# Patient Record
Sex: MALE | Race: WHITE | Employment: FULL TIME | URBAN - METROPOLITAN AREA
[De-identification: names, ages, dates, MRNs, and addresses within clinical notes are randomized per-mention and may not be internally consistent; named-entity substitution may affect disease eponyms.]

---

## 2017-04-12 ENCOUNTER — HOSPITAL ENCOUNTER (OUTPATIENT)
Dept: OTHER | Age: 57
Discharge: HOME | End: 2017-04-12
Attending: INTERNAL MEDICINE | Admitting: INTERNAL MEDICINE

## 2017-04-12 LAB
ALBUMIN SERPL-MCNC: 4.8 GM/DL (ref 3.4–5)
ALP BLD-CCNC: 56 IU/L (ref 40–128)
ALT SERPL-CCNC: 19 U/L (ref 10–40)
ANION GAP SERPL CALCULATED.3IONS-SCNC: 8 MMOL/L (ref 4–16)
AST SERPL-CCNC: 22 IU/L (ref 15–37)
BILIRUB SERPL-MCNC: 1.2 MG/DL (ref 0–1)
BUN BLDV-MCNC: 21 MG/DL (ref 6–23)
CALCIUM SERPL-MCNC: 9.5 MG/DL (ref 8.3–10.6)
CHLORIDE BLD-SCNC: 102 MMOL/L (ref 99–110)
CO2: 30 MMOL/L (ref 21–32)
CREAT SERPL-MCNC: 1.1 MG/DL (ref 0.9–1.3)
GFR AFRICAN AMERICAN: >60 ML/MIN/1.73M2
GFR NON-AFRICAN AMERICAN: >60 ML/MIN/1.73M2
GLUCOSE BLD-MCNC: 97 MG/DL (ref 70–140)
POTASSIUM SERPL-SCNC: 4.7 MMOL/L (ref 3.5–5.1)
SODIUM BLD-SCNC: 140 MMOL/L (ref 135–145)
TOTAL PROTEIN: 6.8 GM/DL (ref 6.4–8.2)

## 2017-05-23 ENCOUNTER — HOSPITAL ENCOUNTER (OUTPATIENT)
Dept: OTHER | Age: 57
Discharge: HOME | End: 2017-05-23
Attending: INTERNAL MEDICINE | Admitting: INTERNAL MEDICINE

## 2017-05-23 LAB
ALBUMIN SERPL-MCNC: 4.5 GM/DL (ref 3.4–5)
ALP BLD-CCNC: 64 IU/L (ref 40–129)
ALT SERPL-CCNC: 20 U/L (ref 10–40)
ANION GAP SERPL CALCULATED.3IONS-SCNC: 12 MMOL/L (ref 4–16)
AST SERPL-CCNC: 26 IU/L (ref 15–37)
BILIRUB SERPL-MCNC: 0.7 MG/DL (ref 0–1)
BUN BLDV-MCNC: 20 MG/DL (ref 6–23)
CALCIUM SERPL-MCNC: 9.5 MG/DL (ref 8.3–10.6)
CHLORIDE BLD-SCNC: 103 MMOL/L (ref 99–110)
CO2: 27 MMOL/L (ref 21–32)
CREAT SERPL-MCNC: 1 MG/DL (ref 0.9–1.3)
FOLATE: >20 NG/ML (ref 3.1–17.5)
GFR AFRICAN AMERICAN: >60 ML/MIN/1.73M2
GFR NON-AFRICAN AMERICAN: >60 ML/MIN/1.73M2
GLUCOSE BLD-MCNC: 127 MG/DL (ref 70–140)
POTASSIUM SERPL-SCNC: 5.1 MMOL/L (ref 3.5–5.1)
SODIUM BLD-SCNC: 142 MMOL/L (ref 135–145)
T3 FREE: 3.2 PG/ML (ref 2.3–4.2)
T4 FREE: 1.24 NG/DL (ref 0.9–1.8)
TOTAL PROTEIN: 6.3 GM/DL (ref 6.4–8.2)
TSH HIGH SENSITIVITY: 2.65 UIU/ML (ref 0.27–4.2)
VITAMIN B-12: 541.2 PG/ML (ref 211–911)

## 2017-05-25 LAB — T4 TOTAL: 7.26 UG/DL

## 2018-04-12 ENCOUNTER — HOSPITAL ENCOUNTER (OUTPATIENT)
Dept: ULTRASOUND IMAGING | Age: 58
Discharge: HOME | End: 2018-04-12
Attending: INTERNAL MEDICINE | Admitting: INTERNAL MEDICINE

## 2018-04-12 DIAGNOSIS — R16.1 SPLENOMEGALY: ICD-10-CM

## 2018-04-12 DIAGNOSIS — R16.1 SPLENOMEGALY, NOT ELSEWHERE CLASSIFIED: ICD-10-CM

## 2018-04-12 LAB
BANDED NEUTROPHILS ABSOLUTE COUNT: 0.29 K/CU MM
BANDED NEUTROPHILS RELATIVE PERCENT: 6 % (ref 5–11)
DIFFERENTIAL TYPE: ABNORMAL
EOSINOPHILS ABSOLUTE: 0.2 K/CU MM
EOSINOPHILS RELATIVE PERCENT: 5 % (ref 0–3)
HCT VFR BLD CALC: 45.8 % (ref 42–52)
HEMOGLOBIN: 14.6 GM/DL (ref 13.5–18)
LYMPHOCYTES ABSOLUTE: 1 K/CU MM
LYMPHOCYTES RELATIVE PERCENT: 21 % (ref 24–44)
MCH RBC QN AUTO: 30.4 PG (ref 27–31)
MCHC RBC AUTO-ENTMCNC: 31.9 % (ref 32–36)
MCV RBC AUTO: 95.4 FL (ref 78–100)
MONOCYTES ABSOLUTE: 0.5 K/CU MM
MONOCYTES RELATIVE PERCENT: 10 % (ref 0–4)
PDW BLD-RTO: 11.9 % (ref 11.7–14.9)
PLATELET # BLD: 238 K/CU MM (ref 140–440)
PLT MORPHOLOGY: ABNORMAL
PMV BLD AUTO: 11.3 FL (ref 7.5–11.1)
RBC # BLD: 4.8 M/CU MM (ref 4.6–6.2)
SEGMENTED NEUTROPHILS ABSOLUTE COUNT: 2.9 K/CU MM
SEGMENTED NEUTROPHILS RELATIVE PERCENT: 58 % (ref 36–66)
WBC # BLD: 4.9 K/CU MM (ref 4–10.5)
WBC # BLD: ABNORMAL 10*3/UL

## 2018-10-09 LAB
ALBUMIN SERPL-MCNC: 4.4 G/DL
ALP BLD-CCNC: 60 U/L
ALT SERPL-CCNC: 18 U/L
ANION GAP SERPL CALCULATED.3IONS-SCNC: 2 MMOL/L
AST SERPL-CCNC: 19 U/L
BILIRUB SERPL-MCNC: 0.5 MG/DL (ref 0.1–1.4)
BUN BLDV-MCNC: 18 MG/DL
CALCIUM SERPL-MCNC: 9.8 MG/DL
CHLORIDE BLD-SCNC: 105 MMOL/L
CHOLESTEROL, TOTAL: 142 MG/DL
CHOLESTEROL/HDL RATIO: 2.9
CO2: 30 MMOL/L
CREAT SERPL-MCNC: 1 MG/DL
GFR CALCULATED: 83
GLUCOSE BLD-MCNC: 99 MG/DL
HDLC SERPL-MCNC: 49 MG/DL (ref 35–70)
LDL CHOLESTEROL CALCULATED: 74 MG/DL (ref 0–160)
POTASSIUM SERPL-SCNC: 5.1 MMOL/L
SODIUM BLD-SCNC: 146 MMOL/L
TOTAL PROTEIN: 6.6
TRIGL SERPL-MCNC: 94 MG/DL
VLDLC SERPL CALC-MCNC: 19 MG/DL

## 2019-04-18 ENCOUNTER — HOSPITAL ENCOUNTER (OUTPATIENT)
Age: 59
Setting detail: SPECIMEN
Discharge: HOME | End: 2019-04-18
Payer: COMMERCIAL

## 2019-04-18 LAB
ALBUMIN SERPL-MCNC: 4.4 GM/DL (ref 3.4–5)
ALP BLD-CCNC: 61 IU/L (ref 40–129)
ALT SERPL-CCNC: 15 U/L (ref 10–40)
ANION GAP SERPL CALCULATED.3IONS-SCNC: 8 MMOL/L (ref 4–16)
AST SERPL-CCNC: 28 IU/L (ref 15–37)
BILIRUB SERPL-MCNC: 1.1 MG/DL (ref 0–1)
BUN BLDV-MCNC: 19 MG/DL (ref 6–23)
CALCIUM SERPL-MCNC: 9.1 MG/DL (ref 8.3–10.6)
CHLORIDE BLD-SCNC: 104 MMOL/L (ref 99–110)
CO2: 29 MMOL/L (ref 21–32)
CREAT SERPL-MCNC: 1 MG/DL (ref 0.9–1.3)
GFR AFRICAN AMERICAN: >60 ML/MIN/1.73M2
GFR NON-AFRICAN AMERICAN: >60 ML/MIN/1.73M2
GLUCOSE BLD-MCNC: 112 MG/DL (ref 70–99)
POTASSIUM SERPL-SCNC: 4.8 MMOL/L (ref 3.5–5.1)
SODIUM BLD-SCNC: 141 MMOL/L (ref 135–145)
TOTAL PROTEIN: 6.4 GM/DL (ref 6.4–8.2)

## 2019-04-18 PROCEDURE — 80053 COMPREHEN METABOLIC PANEL: CPT

## 2019-08-09 DIAGNOSIS — N40.0 BENIGN PROSTATIC HYPERPLASIA, UNSPECIFIED WHETHER LOWER URINARY TRACT SYMPTOMS PRESENT: ICD-10-CM

## 2019-08-09 DIAGNOSIS — A60.02 HERPES SIMPLEX OF MALE GENITALIA: ICD-10-CM

## 2019-10-09 DIAGNOSIS — Z00.00 WELL ADULT EXAM: ICD-10-CM

## 2019-10-09 DIAGNOSIS — Z00.00 WELL ADULT EXAM: Primary | ICD-10-CM

## 2019-10-09 LAB
A/G RATIO: 2.8 (ref 1.1–2.2)
ALBUMIN SERPL-MCNC: 5 G/DL (ref 3.4–5)
ALP BLD-CCNC: 66 U/L (ref 40–129)
ALT SERPL-CCNC: 18 U/L (ref 10–40)
ANION GAP SERPL CALCULATED.3IONS-SCNC: 15 MMOL/L (ref 3–16)
AST SERPL-CCNC: 29 U/L (ref 15–37)
BILIRUB SERPL-MCNC: 1.6 MG/DL (ref 0–1)
BUN BLDV-MCNC: 20 MG/DL (ref 7–20)
CALCIUM SERPL-MCNC: 9.7 MG/DL (ref 8.3–10.6)
CHLORIDE BLD-SCNC: 102 MMOL/L (ref 99–110)
CHOLESTEROL, TOTAL: 149 MG/DL (ref 0–199)
CO2: 24 MMOL/L (ref 21–32)
CREAT SERPL-MCNC: 1 MG/DL (ref 0.9–1.3)
GFR AFRICAN AMERICAN: >60
GFR NON-AFRICAN AMERICAN: >60
GLOBULIN: 1.8 G/DL
GLUCOSE BLD-MCNC: 102 MG/DL (ref 70–99)
HDLC SERPL-MCNC: 58 MG/DL (ref 40–60)
LDL CHOLESTEROL CALCULATED: 72 MG/DL
POTASSIUM SERPL-SCNC: 4.9 MMOL/L (ref 3.5–5.1)
SODIUM BLD-SCNC: 141 MMOL/L (ref 136–145)
TOTAL PROTEIN: 6.8 G/DL (ref 6.4–8.2)
TRIGL SERPL-MCNC: 94 MG/DL (ref 0–150)
VLDLC SERPL CALC-MCNC: 19 MG/DL

## 2019-10-14 ENCOUNTER — OFFICE VISIT (OUTPATIENT)
Dept: FAMILY MEDICINE CLINIC | Age: 59
End: 2019-10-14
Payer: COMMERCIAL

## 2019-10-14 VITALS
WEIGHT: 250 LBS | BODY MASS INDEX: 33.13 KG/M2 | DIASTOLIC BLOOD PRESSURE: 76 MMHG | HEIGHT: 73 IN | SYSTOLIC BLOOD PRESSURE: 122 MMHG | HEART RATE: 66 BPM | OXYGEN SATURATION: 98 %

## 2019-10-14 DIAGNOSIS — A60.02 HERPES SIMPLEX OF MALE GENITALIA: ICD-10-CM

## 2019-10-14 DIAGNOSIS — Z23 NEED FOR PROPHYLACTIC VACCINATION AND INOCULATION AGAINST VARICELLA: Primary | ICD-10-CM

## 2019-10-14 PROCEDURE — 90471 IMMUNIZATION ADMIN: CPT | Performed by: FAMILY MEDICINE

## 2019-10-14 PROCEDURE — 99396 PREV VISIT EST AGE 40-64: CPT | Performed by: FAMILY MEDICINE

## 2019-10-14 PROCEDURE — 90686 IIV4 VACC NO PRSV 0.5 ML IM: CPT | Performed by: FAMILY MEDICINE

## 2019-10-14 RX ORDER — VALACYCLOVIR HYDROCHLORIDE 500 MG/1
500 TABLET, FILM COATED ORAL DAILY
Qty: 90 TABLET | Refills: 3 | Status: SHIPPED | OUTPATIENT
Start: 2019-10-14 | End: 2019-10-18 | Stop reason: SDUPTHER

## 2019-10-14 ASSESSMENT — PATIENT HEALTH QUESTIONNAIRE - PHQ9
SUM OF ALL RESPONSES TO PHQ QUESTIONS 1-9: 0
2. FEELING DOWN, DEPRESSED OR HOPELESS: 0
1. LITTLE INTEREST OR PLEASURE IN DOING THINGS: 0
SUM OF ALL RESPONSES TO PHQ9 QUESTIONS 1 & 2: 0
SUM OF ALL RESPONSES TO PHQ QUESTIONS 1-9: 0

## 2019-10-14 ASSESSMENT — ENCOUNTER SYMPTOMS
ABDOMINAL PAIN: 0
COUGH: 0
CHEST TIGHTNESS: 0
RESPIRATORY NEGATIVE: 1
ALLERGIC/IMMUNOLOGIC NEGATIVE: 1
WHEEZING: 0
SHORTNESS OF BREATH: 0

## 2019-10-18 RX ORDER — VALACYCLOVIR HYDROCHLORIDE 500 MG/1
1000 TABLET, FILM COATED ORAL DAILY
Qty: 90 TABLET | Refills: 3 | Status: SHIPPED | OUTPATIENT
Start: 2019-10-18 | End: 2020-10-15 | Stop reason: SDUPTHER

## 2019-11-06 ENCOUNTER — TELEPHONE (OUTPATIENT)
Dept: FAMILY MEDICINE CLINIC | Age: 59
End: 2019-11-06

## 2020-03-23 ENCOUNTER — TELEPHONE (OUTPATIENT)
Dept: INTERNAL MEDICINE CLINIC | Age: 60
End: 2020-03-23

## 2020-08-31 PROBLEM — R16.1 SPLENOMEGALY, NOT ELSEWHERE CLASSIFIED: Status: ACTIVE | Noted: 2020-08-31

## 2020-08-31 PROBLEM — L43.8 OTHER LICHEN PLANUS: Status: ACTIVE | Noted: 2020-08-31

## 2020-09-09 ENCOUNTER — TELEPHONE (OUTPATIENT)
Dept: ONCOLOGY | Age: 60
End: 2020-09-09

## 2020-09-09 NOTE — TELEPHONE ENCOUNTER
Spoke with patient regarding his U/S on 09.15.2020 patient arr 0830 at [facility]. Gave patient prep and he stated understanding.

## 2020-09-15 ENCOUNTER — HOSPITAL ENCOUNTER (OUTPATIENT)
Dept: ULTRASOUND IMAGING | Age: 60
Discharge: HOME | End: 2020-09-15
Payer: COMMERCIAL

## 2020-09-15 PROCEDURE — 76705 ECHO EXAM OF ABDOMEN: CPT

## 2020-09-17 ENCOUNTER — HOSPITAL ENCOUNTER (OUTPATIENT)
Dept: INFUSION THERAPY | Age: 60
Discharge: HOME | End: 2020-09-17
Payer: COMMERCIAL

## 2020-09-17 DIAGNOSIS — N40.0 BENIGN PROSTATIC HYPERPLASIA WITHOUT LOWER URINARY TRACT SYMPTOMS: ICD-10-CM

## 2020-09-17 LAB
ALBUMIN SERPL-MCNC: 4.7 GM/DL (ref 3.4–5)
ALP BLD-CCNC: 68 IU/L (ref 40–128)
ALT SERPL-CCNC: 17 U/L (ref 10–40)
ANION GAP SERPL CALCULATED.3IONS-SCNC: 10 MMOL/L (ref 4–16)
AST SERPL-CCNC: 24 IU/L (ref 15–37)
BASOPHILS ABSOLUTE: 0 K/CU MM
BASOPHILS RELATIVE PERCENT: 0.5 % (ref 0–1)
BILIRUB SERPL-MCNC: 0.9 MG/DL (ref 0–1)
BUN BLDV-MCNC: 20 MG/DL (ref 6–23)
CALCIUM SERPL-MCNC: 9.6 MG/DL (ref 8.3–10.6)
CHLORIDE BLD-SCNC: 101 MMOL/L (ref 99–110)
CO2: 25 MMOL/L (ref 21–32)
CREAT SERPL-MCNC: 1 MG/DL (ref 0.9–1.3)
DIFFERENTIAL TYPE: ABNORMAL
EOSINOPHILS ABSOLUTE: 0.2 K/CU MM
EOSINOPHILS RELATIVE PERCENT: 2.1 % (ref 0–3)
GFR AFRICAN AMERICAN: >60 ML/MIN/1.73M2
GFR NON-AFRICAN AMERICAN: >60 ML/MIN/1.73M2
GLUCOSE BLD-MCNC: 103 MG/DL (ref 70–99)
HCT VFR BLD CALC: 45.1 % (ref 42–52)
HEMOGLOBIN: 15 GM/DL (ref 13.5–18)
LYMPHOCYTES ABSOLUTE: 1.5 K/CU MM
LYMPHOCYTES RELATIVE PERCENT: 19.9 % (ref 24–44)
MCH RBC QN AUTO: 30.8 PG (ref 27–31)
MCHC RBC AUTO-ENTMCNC: 33.3 % (ref 32–36)
MCV RBC AUTO: 92.6 FL (ref 78–100)
MONOCYTES ABSOLUTE: 0.6 K/CU MM
MONOCYTES RELATIVE PERCENT: 7.7 % (ref 0–4)
PDW BLD-RTO: 13.3 % (ref 11.7–14.9)
PLATELET # BLD: 270 K/CU MM (ref 140–440)
PMV BLD AUTO: 10.7 FL (ref 7.5–11.1)
POTASSIUM SERPL-SCNC: 4.7 MMOL/L (ref 3.5–5.1)
RBC # BLD: 4.87 M/CU MM (ref 4.6–6.2)
SEGMENTED NEUTROPHILS ABSOLUTE COUNT: 5.3 K/CU MM
SEGMENTED NEUTROPHILS RELATIVE PERCENT: 69.8 % (ref 36–66)
SODIUM BLD-SCNC: 136 MMOL/L (ref 135–145)
TOTAL PROTEIN: 6.8 GM/DL (ref 6.4–8.2)
WBC # BLD: 7.5 K/CU MM (ref 4–10.5)

## 2020-09-17 PROCEDURE — 85025 COMPLETE CBC W/AUTO DIFF WBC: CPT

## 2020-09-17 PROCEDURE — 80053 COMPREHEN METABOLIC PANEL: CPT

## 2020-09-17 PROCEDURE — 36415 COLL VENOUS BLD VENIPUNCTURE: CPT

## 2020-09-22 NOTE — PROGRESS NOTES
Patient Name: Lebron Hollins  Patient : 1960  Patient MRN: [id number]     Primary Oncologist: Sabrina De La Fuente MD  Referring Provider: Rico Hashimoto, MD     Date of Service: 2020      Chief Complaint:   Chief Complaint   Patient presents with   [address]     He came in for follow-up visit. Patient Active Problem List:     BPH (benign prostatic hyperplasia)     Herpes simplex of male genitalia     Splenomegaly, not elsewhere classified     Other lichen planus     HPI:   Mr. Loreto Rudd is a pleasant 55-year-old  male patient who was initially referred in 2011 for evaluation of splenomegaly. He had a remote history of EBV. I reviewed his CAT scan at the time with the radiologist who felt that he had an elongated spleen and the volume of the spleen was rather normal.     Serum protein electrophoresis study in 2011 was negative and CBCD was normal, with no signs of hypersplenism. Followup ultrasound of the spleen in 2012 showed no significant change with mild splenomegaly, measuring about 15 cm. Blood test on April 10, 2013 showed white cell count of 8.6, hemoglobin of 14.6, platelet 556, MCV 95. CMP was benign, except for slightly elevated AST at 40. This slightly elevated liver enzyme could be related to the medications versus alcohol. He admitted that he drinks beer occasionally. Blood test in 2013 showed white cell count at 6.5, hemoglobin 15.4, platelet 667. CMP was within normal limits, except for glucose of 107. PSA was 1.29. Abdominal ultrasound in 2013 showed stable mild to moderate splenomegaly at 14.5 cm. There is no sign of splenic sequestration. Colonoscopy in 2013 by Dr. Slime Grewal showed a single, less than 5 mm polyp, found in  rectum, which was removed. The pathology report was consistent with a hyperplastic polyp.   He had green light laser surgery for the BPH on 2013, by Dr. Shital Levi and since then he stopped taking the Flomax. Blood test on April 9, 2014 showed normal CBC and CMP. He lost about 40 pounds over one year period of time. Blood test in October 2014 showed white cell count of 6.9, hemoglobin 15.7, platelet 511. CMP was within normal limits, except for total bilirubin 1.3. Blood test in April 2015 showed normal CBC and CMP. A followup ultrasound of the liver and spleen showed mild hepatosplenomegaly with the liver measuring 19.4 and spleen 13.8 cm. The liver finding was compatible with fatty change. Blood test in October 2015 showed no evidence of splenic sequestration, despite his reported splenomegaly. White cell count was 7.2, hemoglobin 15, platelet 424. CMP was benign, with total bilirubin 1.1. Blood test in April 2016 revealed normal CBC and CMP. There is no sign other splenic sequestration. His ultrasound of the liver and spleen revealed stable splenomegaly, 13.5 cm, with gallstones. He was diagnosed with lichen planus and was treated with a local steroid by the dermatologist, Dr. Vanna Jean Baptiste. He also received steroid injections. Blood test on April 12, 2017, revealed white cell 7.3, hemoglobin 14.8, hematocrit 48.2. MCV was slightly elevated at 102.1, platelet 765. He is agreeable to have his CBC checked in one month with folic acid level, Z75 level, and TSH. CMP was benign, except for the total bilirubin 1.2. I doubt that he has hemolytic anemia. Ultrasound of the abdomen in April 2017 revealed cholelithiasis without gallbladder wall thickening and slight decrease in size of the spleen measuring 13.5 by 4.7 by 5.9. Liver was normal in echogenicity. Ultrasound of abdomen in April 20 18th revealed borderline splenomegaly 13.8 cm, cholelithiasis and fatty liver. Labs in April 2019 were reviewed and stable. On September 24, 2020 he came in for follow-up visit.   Ultrasound in September 2020 showed mild splenomegaly 12.8 cm in length with cholelithiasis and likely hepatic fatty infiltration. We discussed about healthy diet. He denies any symptoms to suggest cholecystitis. CBC was grossly unremarkable. CMP was stable. He lost weight intentionally since last office visit. He denied any acute pain or depression. Reportedly he had cystoscopy in 2019 and was found to have recurrent BPH. He received cream light therapy. He was followed by back I dermatologist.  We discussed about the flu shot. He will follow-up with Dr. Yazmin Ann for colonoscopy. Energy level is OK. He is active. Appetite is okay. He denies any symptoms to suggest COVID-19. No fever or chills. No shortness of breath or cough. No abdominal pain, nausea, vomiting, melena, or hematochezia. No dysuria or hematuria. 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 He has a history of hernia surgery, BPH diagnosed in 2006, and herpetic simplex viral illness diagnosed in 1990. FAMILY HISTORY:  Father had prostate cancer, paternal grandfather had prostate cancer, maternal grandfather had lung cancer, and maternal grandmother had questionable ovarian cancer. SOCIAL HISTORY:  He denied any history of smoking. He drinks beer occasionally. MEDICATIONS:  Reviewed as per chart. ALLERGIES:  Reviewed as per chart. Review of Systems: \"Per interval history; otherwise 10 point ROS is negative. \"     Vital Signs:  /80 (Site: Right Upper Arm, Position: Sitting, Cuff Size: Large Adult)   Pulse 91   Temp 97.9 °F (36.6 °C) (Infrared)   Resp 16   Ht 6' 1\" (1.854 m)   Wt 245 lb 3.2 oz (111.2 kg)   SpO2 96%   BMI 32.35 kg/m²     Physical Exam:  CONSTITUTIONAL: awake, alert, cooperative, no apparent distress   EYES: pupils equal, round and reactive to light, sclera clear and conjunctiva normal  ENT: Normocephalic, without obvious abnormality, atraumatic  NECK: supple, symmetrical, no jugular venous distension and no carotid bruits   HEMATOLOGIC/LYMPHATIC: no cervical, supraclavicular or axillary lymphadenopathy   LUNGS: no increased work of breathing and clear to auscultation   CARDIOVASCULAR: regular rate and rhythm, normal S1 and S2, no murmur noted  ABDOMEN: normal bowel sounds x 4, soft, non-distended, non-tender, no masses palpated, no hepatosplenomegaly   MUSCULOSKELETAL: full range of motion noted, tone is normal  NEUROLOGIC: awake, alert, oriented to name, place and time. Motor skills grossly intact. SKIN: No jaundice. appears intact   EXTREMITIES: no LE edema. Nurses.      Labs:  Hematology:  Lab Results   Component Value Date    WBC 7.5 09/17/2020    RBC 4.87 09/17/2020    HGB 15.0 09/17/2020    HCT 45.1 09/17/2020    MCV 92.6 09/17/2020    MCH 30.8 09/17/2020    MCHC 33.3 09/17/2020    RDW 13.3 09/17/2020     09/17/2020    MPV 10.7 09/17/2020    BANDSPCT 6 04/12/2018    SEGSPCT 69.8 (H) 09/17/2020    EOSRELPCT 2.1 09/17/2020    BASOPCT 0.5 09/17/2020    LYMPHOPCT 19.9 (L) 09/17/2020    MONOPCT 7.7 (H) 09/17/2020    BANDABS 0.29 04/12/2018    SEGSABS 5.3 09/17/2020    EOSABS 0.2 09/17/2020    BASOSABS 0.0 09/17/2020    LYMPHSABS 1.5 09/17/2020    MONOSABS 0.6 09/17/2020    DIFFTYPE AUTOMATED DIFFERENTIAL 09/17/2020    WBCMORP OCCASIONAL 04/12/2018    PLTM SEVERAL LARGE PLATELETS 88/64/8379     Chemistry:  Lab Results   Component Value Date     09/17/2020    K 4.7 09/17/2020     09/17/2020    CO2 25 09/17/2020    BUN 20 09/17/2020    CREATININE 1.0 09/17/2020    GLUCOSE 103 (H) 09/17/2020    CALCIUM 9.6 09/17/2020    PROT 6.8 09/17/2020    LABALBU 4.7 09/17/2020    BILITOT 0.9 09/17/2020    ALKPHOS 68 09/17/2020    AST 24 09/17/2020    ALT 17 09/17/2020    LABGLOM >60 09/17/2020    GFRAA >60 09/17/2020    AGRATIO 2.8 (H) 10/09/2019    GLOB 1.8 10/09/2019     Lab Results   Component Value Date    TSHHS 2.650 05/23/2017    T4FREE 1.24 05/23/2017    FT3 3.2 05/23/2017     Immunology:  Lab Results   Component

## 2020-09-24 ENCOUNTER — HOSPITAL ENCOUNTER (OUTPATIENT)
Dept: INFUSION THERAPY | Age: 60
Discharge: HOME | End: 2020-09-24
Payer: COMMERCIAL

## 2020-09-24 ENCOUNTER — OFFICE VISIT (OUTPATIENT)
Dept: ONCOLOGY | Age: 60
End: 2020-09-24
Payer: COMMERCIAL

## 2020-09-24 VITALS
SYSTOLIC BLOOD PRESSURE: 126 MMHG | HEIGHT: 73 IN | RESPIRATION RATE: 16 BRPM | OXYGEN SATURATION: 96 % | WEIGHT: 245.2 LBS | TEMPERATURE: 97.9 F | DIASTOLIC BLOOD PRESSURE: 80 MMHG | BODY MASS INDEX: 32.5 KG/M2 | HEART RATE: 91 BPM

## 2020-09-24 PROCEDURE — 99211 OFF/OP EST MAY X REQ PHY/QHP: CPT

## 2020-09-24 PROCEDURE — 99213 OFFICE O/P EST LOW 20 MIN: CPT | Performed by: INTERNAL MEDICINE

## 2020-09-24 ASSESSMENT — PATIENT HEALTH QUESTIONNAIRE - PHQ9
1. LITTLE INTEREST OR PLEASURE IN DOING THINGS: 0
SUM OF ALL RESPONSES TO PHQ QUESTIONS 1-9: 0
SUM OF ALL RESPONSES TO PHQ9 QUESTIONS 1 & 2: 0
SUM OF ALL RESPONSES TO PHQ QUESTIONS 1-9: 0
2. FEELING DOWN, DEPRESSED OR HOPELESS: 0

## 2020-09-24 NOTE — PROGRESS NOTES
MA Rooming Questions  Patient: Jacob Hall  MRN: [id number]    Date: 9/24/2020        1. Do you have any new issues?   no         2. Do you need any refills on medications?    no    3. Have you had any imaging done since your last visit?   no    4. Have you been hospitalized or seen in the emergency room since your last visit here?   no    5. Did the patient have a depression screening completed today?  Yes    PHQ-9 Total Score: 0 (9/24/2020  2:38 PM)       PHQ-9 Given to (if applicable):               PHQ-9 Score (if applicable):                     [] Positive     []  Negative              Does question #9 need addressed (if applicable)                     [] Yes    []  No               Darcie Griffin MA

## 2020-10-08 DIAGNOSIS — N40.0 BENIGN PROSTATIC HYPERPLASIA WITHOUT LOWER URINARY TRACT SYMPTOMS: ICD-10-CM

## 2020-10-08 DIAGNOSIS — Z13.220 LIPID SCREENING: ICD-10-CM

## 2020-10-08 DIAGNOSIS — Z13.1 SCREENING FOR DIABETES MELLITUS (DM): ICD-10-CM

## 2020-10-08 DIAGNOSIS — Z11.4 SCREENING FOR HUMAN IMMUNODEFICIENCY VIRUS: ICD-10-CM

## 2020-10-08 DIAGNOSIS — R16.1 SPLENOMEGALY: ICD-10-CM

## 2020-10-08 LAB
A/G RATIO: 2.5 (ref 1.1–2.2)
ALBUMIN SERPL-MCNC: 4.7 G/DL (ref 3.4–5)
ALP BLD-CCNC: 72 U/L (ref 40–129)
ALT SERPL-CCNC: 16 U/L (ref 10–40)
ANION GAP SERPL CALCULATED.3IONS-SCNC: 12 MMOL/L (ref 3–16)
AST SERPL-CCNC: 26 U/L (ref 15–37)
BASOPHILS ABSOLUTE: 0 K/UL (ref 0–0.2)
BASOPHILS RELATIVE PERCENT: 0.8 %
BILIRUB SERPL-MCNC: 1.3 MG/DL (ref 0–1)
BUN BLDV-MCNC: 16 MG/DL (ref 7–20)
CALCIUM SERPL-MCNC: 9.8 MG/DL (ref 8.3–10.6)
CHLORIDE BLD-SCNC: 102 MMOL/L (ref 99–110)
CHOLESTEROL, TOTAL: 145 MG/DL (ref 0–199)
CO2: 27 MMOL/L (ref 21–32)
CREAT SERPL-MCNC: 1.1 MG/DL (ref 0.9–1.3)
EOSINOPHILS ABSOLUTE: 0.1 K/UL (ref 0–0.6)
EOSINOPHILS RELATIVE PERCENT: 1.5 %
GFR AFRICAN AMERICAN: >60
GFR NON-AFRICAN AMERICAN: >60
GLOBULIN: 1.9 G/DL
GLUCOSE BLD-MCNC: 87 MG/DL (ref 70–99)
HCT VFR BLD CALC: 47.7 % (ref 40.5–52.5)
HDLC SERPL-MCNC: 45 MG/DL (ref 40–60)
HEMOGLOBIN: 15.8 G/DL (ref 13.5–17.5)
LDL CHOLESTEROL CALCULATED: 81 MG/DL
LYMPHOCYTES ABSOLUTE: 1.2 K/UL (ref 1–5.1)
LYMPHOCYTES RELATIVE PERCENT: 22.4 %
MCH RBC QN AUTO: 30.9 PG (ref 26–34)
MCHC RBC AUTO-ENTMCNC: 33.2 G/DL (ref 31–36)
MCV RBC AUTO: 93 FL (ref 80–100)
MONOCYTES ABSOLUTE: 0.4 K/UL (ref 0–1.3)
MONOCYTES RELATIVE PERCENT: 6.7 %
NEUTROPHILS ABSOLUTE: 3.7 K/UL (ref 1.7–7.7)
NEUTROPHILS RELATIVE PERCENT: 68.6 %
PDW BLD-RTO: 13.3 % (ref 12.4–15.4)
PLATELET # BLD: 261 K/UL (ref 135–450)
PMV BLD AUTO: 9.2 FL (ref 5–10.5)
POTASSIUM SERPL-SCNC: 5 MMOL/L (ref 3.5–5.1)
PROSTATE SPECIFIC ANTIGEN: 0.63 NG/ML (ref 0–4)
RBC # BLD: 5.13 M/UL (ref 4.2–5.9)
SODIUM BLD-SCNC: 141 MMOL/L (ref 136–145)
TOTAL PROTEIN: 6.6 G/DL (ref 6.4–8.2)
TRIGL SERPL-MCNC: 93 MG/DL (ref 0–150)
VLDLC SERPL CALC-MCNC: 19 MG/DL
WBC # BLD: 5.4 K/UL (ref 4–11)

## 2020-10-09 LAB
ESTIMATED AVERAGE GLUCOSE: 108.3 MG/DL
HBA1C MFR BLD: 5.4 %
HIV AG/AB: NORMAL
HIV ANTIGEN: NORMAL
HIV-1 ANTIBODY: NORMAL
HIV-2 AB: NORMAL

## 2020-10-15 ENCOUNTER — OFFICE VISIT (OUTPATIENT)
Dept: FAMILY MEDICINE CLINIC | Age: 60
End: 2020-10-15
Payer: COMMERCIAL

## 2020-10-15 VITALS
SYSTOLIC BLOOD PRESSURE: 124 MMHG | WEIGHT: 244.4 LBS | BODY MASS INDEX: 32.39 KG/M2 | HEART RATE: 98 BPM | DIASTOLIC BLOOD PRESSURE: 80 MMHG | OXYGEN SATURATION: 98 % | TEMPERATURE: 97.5 F | HEIGHT: 73 IN

## 2020-10-15 PROCEDURE — 90471 IMMUNIZATION ADMIN: CPT | Performed by: FAMILY MEDICINE

## 2020-10-15 PROCEDURE — 90686 IIV4 VACC NO PRSV 0.5 ML IM: CPT | Performed by: FAMILY MEDICINE

## 2020-10-15 PROCEDURE — 99396 PREV VISIT EST AGE 40-64: CPT | Performed by: FAMILY MEDICINE

## 2020-10-15 RX ORDER — VALACYCLOVIR HYDROCHLORIDE 500 MG/1
500 TABLET, FILM COATED ORAL DAILY
Qty: 90 TABLET | Refills: 3 | Status: SHIPPED | OUTPATIENT
Start: 2020-10-15 | End: 2021-09-24 | Stop reason: SDUPTHER

## 2020-10-15 ASSESSMENT — ENCOUNTER SYMPTOMS
COUGH: 0
SHORTNESS OF BREATH: 0
DIARRHEA: 0
SORE THROAT: 0
ABDOMINAL PAIN: 0

## 2020-10-15 NOTE — PROGRESS NOTES
Vaccine Information Sheet, \"Influenza - Inactivated\"  given to Brittnee Olvera, or parent/legal guardian of  Brittnee Olvera and verbalized understanding. Patient responses:    Have you ever had a reaction to a flu vaccine? No  Do you have any current illness? No  Have you ever had [place] Syndrome? No  Do you have a serious allergy to any of the follow: Neomycin, Polymyxin, Thimerosal, eggs or egg products? No    Flu vaccine given per order. Please see immunization tab. Risks and benefits explained. Current VIS given.

## 2020-10-15 NOTE — PROGRESS NOTES
10/15/2020     Myron Aguilar      Chief Complaint   Patient presents with   [facility]       HPI      Lauren Caldwell is a 62 y.o. male who presents today with the followin. Needs flu shot    2. Benign prostatic hyperplasia without lower urinary tract symptoms    3. Herpes simplex of male genitalia    4. Splenomegaly, not elsewhere classified    5. Wellness examination      Is here for annual wellness check  The patient has recurrent herpes simplex type II been on valacyclovir for years  It works very well he had been lowered down to 500 mg daily and still working well was asking about long-term maintenance with this I think would be fine to continue this dose as he has very infrequent outbreaks  REVIEW OF SYMPTOMS    Review of Systems   Constitutional: Negative. Negative for activity change, fever and unexpected weight change. HENT: Negative for congestion and sore throat. Respiratory: Negative for cough and shortness of breath. Cardiovascular: Negative for chest pain. Gastrointestinal: Negative for abdominal pain and diarrhea. Genitourinary:        Has a history of BPH minimally symptomatic  He is not on any medication for this   Neurological: Negative. Hematological:        Patient was found to have splenomegaly  He is being followed by an oncologist  There is no specific diagnosis at this time   Psychiatric/Behavioral: Negative.         PAST MEDICAL HISTORY  Past Medical History:   Diagnosis Date    BPH (benign prostatic hyperplasia)     Cancer ([address]     Testicular    Herpes simplex of male genitalia     Urinary frequency        FAMILY HISTORY  Family History   Problem Relation Age of Onset    Heart Disease Mother     Heart Disease Father     Cancer Father         prostate       SOCIAL HISTORY  Social History     Socioeconomic History    Marital status:      Spouse name: None    Number of children: None    Years of education: None    Highest education level: None   Occupational History    None   Social Needs    Financial resource strain: None    Food insecurity     Worry: None     Inability: None    Transportation needs     Medical: None     Non-medical: None   Tobacco Use    Smoking status: Never Smoker    Smokeless tobacco: Never Used   Substance and Sexual Activity    Alcohol use: Yes     Alcohol/week: 1.0 standard drinks     Types: 1 Cans of beer per week    Drug use: No    Sexual activity: Never   Lifestyle    Physical activity     Days per week: None     Minutes per session: None    Stress: None   Relationships    Social connections     Talks on phone: None     Gets together: None     Attends Mosque service: None     Active member of club or organization: None     Attends meetings of clubs or organizations: None     Relationship status: None    Intimate partner violence     Fear of current or ex partner: None     Emotionally abused: None     Physically abused: None     Forced sexual activity: None   Other Topics Concern    None   Social History Narrative    None        SURGICAL HISTORY  Past Surgical History:   Procedure Laterality Date    COLONOSCOPY  11-22-13    poly's    HERNIA REPAIR Left 1978    inguinal    TESTICLE REMOVAL Left     TONSILLECTOMY         CURRENT MEDICATIONS  Current Outpatient Medications   Medication Sig Dispense Refill    valACYclovir (VALTREX) 500 MG tablet Take 1 tablet by mouth daily 90 tablet 3    Multiple Vitamins-Minerals (THERAPEUTIC MULTIVITAMIN-MINERALS) tablet Take 1 tablet by mouth daily. No current facility-administered medications for this visit. ALLERGIES  No Known Allergies    PHYSICAL EXAM    /80 (Site: Left Upper Arm, Position: Sitting, Cuff Size: Medium Adult)   Pulse 98   Temp 97.5 °F (36.4 °C) (Temporal)   Ht 6' 1\" (1.854 m)   Wt 244 lb 6.4 oz (110.9 kg)   SpO2 98%   BMI 32.24 kg/m²     Physical Exam  Vitals signs and nursing note reviewed.    Constitutional:       General: He is not in acute distress. Appearance: Normal appearance. HENT:      Right Ear: External ear normal.      Left Ear: External ear normal.   Eyes:      Conjunctiva/sclera: Conjunctivae normal.   Cardiovascular:      Rate and Rhythm: Normal rate. Pulmonary:      Effort: Pulmonary effort is normal. No respiratory distress. Neurological:      General: No focal deficit present. Mental Status: He is alert. Psychiatric:         Mood and Affect: Mood normal.     Most recent labs reviewed  His results are in the satisfactory range  This included a PSA  He is up-to-date on colon cancer screening    ASSESSMENT and PLAN    [facility] was seen today for wellness program.    Diagnoses and all orders for this visit:    Needs flu shot  -     Influenza, Quadv, 3 yrs and older, IM, PF, Prefill Syr or SDV, 0.5mL (AFLURIA QUADV, PF)    Benign prostatic hyperplasia without lower urinary tract symptoms    Herpes simplex of male genitalia    Splenomegaly, not elsewhere classified    Wellness examination    Other orders  -     valACYclovir (VALTREX) 500 MG tablet; Take 1 tablet by mouth daily    Vaccine  Continue same medication  Follow-up in 6 months    Return in about 6 months (around 4/15/2021). Electronically signed by Goeffrey Ramirez MD on 10/15/2020    Please note that this chart was generated using dragon dictation software. Although every effort was made to ensure the accuracy of this automated transcription, some errors in transcription may have occurred.

## 2020-11-04 ENCOUNTER — APPOINTMENT (OUTPATIENT)
Dept: GENERAL RADIOLOGY | Age: 60
DRG: 177 | End: 2020-11-04
Payer: COMMERCIAL

## 2020-11-04 ENCOUNTER — HOSPITAL ENCOUNTER (INPATIENT)
Age: 60
LOS: 7 days | Discharge: HOME | DRG: 177 | End: 2020-11-11
Attending: EMERGENCY MEDICINE | Admitting: STUDENT IN AN ORGANIZED HEALTH CARE EDUCATION/TRAINING PROGRAM
Payer: COMMERCIAL

## 2020-11-04 PROBLEM — J06.9 2019-NCOV ACUTE RESPIRATORY DISEASE: Status: ACTIVE | Noted: 2020-11-04

## 2020-11-04 PROBLEM — U07.1 2019-NCOV ACUTE RESPIRATORY DISEASE: Status: ACTIVE | Noted: 2020-11-04

## 2020-11-04 LAB
ALBUMIN SERPL-MCNC: 3.4 GM/DL (ref 3.4–5)
ALP BLD-CCNC: 57 IU/L (ref 40–129)
ALT SERPL-CCNC: 23 U/L (ref 10–40)
ANION GAP SERPL CALCULATED.3IONS-SCNC: 13 MMOL/L (ref 4–16)
AST SERPL-CCNC: 46 IU/L (ref 15–37)
BANDED NEUTROPHILS ABSOLUTE COUNT: 0.72 K/CU MM
BANDED NEUTROPHILS RELATIVE PERCENT: 15 % (ref 5–11)
BILIRUB SERPL-MCNC: 0.9 MG/DL (ref 0–1)
BUN BLDV-MCNC: 28 MG/DL (ref 6–23)
CALCIUM SERPL-MCNC: 8.7 MG/DL (ref 8.3–10.6)
CHLORIDE BLD-SCNC: 96 MMOL/L (ref 99–110)
CO2: 25 MMOL/L (ref 21–32)
CREAT SERPL-MCNC: 1.1 MG/DL (ref 0.9–1.3)
DIFFERENTIAL TYPE: ABNORMAL
GFR AFRICAN AMERICAN: >60 ML/MIN/1.73M2
GFR NON-AFRICAN AMERICAN: >60 ML/MIN/1.73M2
GIANT PLATELETS: ABNORMAL
GLUCOSE BLD-MCNC: 129 MG/DL (ref 70–99)
HCT VFR BLD CALC: 43.6 % (ref 42–52)
HEMOGLOBIN: 14.6 GM/DL (ref 13.5–18)
LACTATE: 1.5 MMOL/L (ref 0.4–2)
LIPASE: 20 IU/L (ref 13–60)
LYMPHOCYTES ABSOLUTE: 0.7 K/CU MM
LYMPHOCYTES RELATIVE PERCENT: 14 % (ref 24–44)
MCH RBC QN AUTO: 30.9 PG (ref 27–31)
MCHC RBC AUTO-ENTMCNC: 33.5 % (ref 32–36)
MCV RBC AUTO: 92.4 FL (ref 78–100)
MONOCYTES ABSOLUTE: 0.4 K/CU MM
MONOCYTES RELATIVE PERCENT: 8 % (ref 0–4)
PDW BLD-RTO: 12.4 % (ref 11.7–14.9)
PLATELET # BLD: 201 K/CU MM (ref 140–440)
PMV BLD AUTO: 10.9 FL (ref 7.5–11.1)
POTASSIUM SERPL-SCNC: 4.2 MMOL/L (ref 3.5–5.1)
RBC # BLD: 4.72 M/CU MM (ref 4.6–6.2)
SEGMENTED NEUTROPHILS ABSOLUTE COUNT: 3 K/CU MM
SEGMENTED NEUTROPHILS RELATIVE PERCENT: 63 % (ref 36–66)
SODIUM BLD-SCNC: 134 MMOL/L (ref 135–145)
TOTAL PROTEIN: 6.4 GM/DL (ref 6.4–8.2)
TROPONIN T: <0.01 NG/ML
WBC # BLD: 4.8 K/CU MM (ref 4–10.5)

## 2020-11-04 PROCEDURE — XW13325 TRANSFUSION OF CONVALESCENT PLASMA (NONAUTOLOGOUS) INTO PERIPHERAL VEIN, PERCUTANEOUS APPROACH, NEW TECHNOLOGY GROUP 5: ICD-10-PCS | Performed by: STUDENT IN AN ORGANIZED HEALTH CARE EDUCATION/TRAINING PROGRAM

## 2020-11-04 PROCEDURE — 99285 EMERGENCY DEPT VISIT HI MDM: CPT

## 2020-11-04 PROCEDURE — 93005 ELECTROCARDIOGRAM TRACING: CPT | Performed by: EMERGENCY MEDICINE

## 2020-11-04 PROCEDURE — 83690 ASSAY OF LIPASE: CPT

## 2020-11-04 PROCEDURE — 86901 BLOOD TYPING SEROLOGIC RH(D): CPT

## 2020-11-04 PROCEDURE — 86900 BLOOD TYPING SEROLOGIC ABO: CPT

## 2020-11-04 PROCEDURE — 71045 X-RAY EXAM CHEST 1 VIEW: CPT

## 2020-11-04 PROCEDURE — 96374 THER/PROPH/DIAG INJ IV PUSH: CPT

## 2020-11-04 PROCEDURE — 85007 BL SMEAR W/DIFF WBC COUNT: CPT

## 2020-11-04 PROCEDURE — XW033E5 INTRODUCTION OF REMDESIVIR ANTI-INFECTIVE INTO PERIPHERAL VEIN, PERCUTANEOUS APPROACH, NEW TECHNOLOGY GROUP 5: ICD-10-PCS | Performed by: STUDENT IN AN ORGANIZED HEALTH CARE EDUCATION/TRAINING PROGRAM

## 2020-11-04 PROCEDURE — 1200000000 HC SEMI PRIVATE

## 2020-11-04 PROCEDURE — 85027 COMPLETE CBC AUTOMATED: CPT

## 2020-11-04 PROCEDURE — 80053 COMPREHEN METABOLIC PANEL: CPT

## 2020-11-04 PROCEDURE — 6370000000 HC RX 637 (ALT 250 FOR IP): Performed by: EMERGENCY MEDICINE

## 2020-11-04 PROCEDURE — 6360000002 HC RX W HCPCS: Performed by: EMERGENCY MEDICINE

## 2020-11-04 PROCEDURE — 84484 ASSAY OF TROPONIN QUANT: CPT

## 2020-11-04 PROCEDURE — 83605 ASSAY OF LACTIC ACID: CPT

## 2020-11-04 RX ORDER — ACETAMINOPHEN 325 MG/1
650 TABLET ORAL ONCE
Status: COMPLETED | OUTPATIENT
Start: 2020-11-04 | End: 2020-11-04

## 2020-11-04 RX ORDER — ONDANSETRON 4 MG/1
4 TABLET, ORALLY DISINTEGRATING ORAL ONCE
Status: COMPLETED | OUTPATIENT
Start: 2020-11-04 | End: 2020-11-04

## 2020-11-04 RX ORDER — DEXAMETHASONE SODIUM PHOSPHATE 10 MG/ML
10 INJECTION, SOLUTION INTRAMUSCULAR; INTRAVENOUS ONCE
Status: COMPLETED | OUTPATIENT
Start: 2020-11-04 | End: 2020-11-04

## 2020-11-04 RX ADMIN — ACETAMINOPHEN 650 MG: 325 TABLET ORAL at 22:13

## 2020-11-04 RX ADMIN — DEXAMETHASONE SODIUM PHOSPHATE 10 MG: 10 INJECTION, SOLUTION INTRAMUSCULAR; INTRAVENOUS at 22:47

## 2020-11-04 RX ADMIN — ONDANSETRON 4 MG: 4 TABLET, ORALLY DISINTEGRATING ORAL at 22:13

## 2020-11-04 ASSESSMENT — ENCOUNTER SYMPTOMS
SORE THROAT: 0
SHORTNESS OF BREATH: 1
ABDOMINAL PAIN: 0
COLOR CHANGE: 0
DIARRHEA: 0
NAUSEA: 0
VOMITING: 0
WHEEZING: 0
CHEST TIGHTNESS: 0
COUGH: 1
BACK PAIN: 1
RHINORRHEA: 0

## 2020-11-04 ASSESSMENT — PAIN SCALES - GENERAL: PAINLEVEL_OUTOF10: 7

## 2020-11-05 PROBLEM — E44.0 MODERATE MALNUTRITION (HCC): Chronic | Status: ACTIVE | Noted: 2020-11-05

## 2020-11-05 LAB
ABO/RH: NORMAL
ABO/RH: NORMAL
ALBUMIN SERPL-MCNC: 3.8 GM/DL (ref 3.4–5)
ALBUMIN SERPL-MCNC: 3.8 GM/DL (ref 3.4–5)
ALP BLD-CCNC: 56 IU/L (ref 40–128)
ALP BLD-CCNC: 56 IU/L (ref 40–129)
ALT SERPL-CCNC: 23 U/L (ref 10–40)
ALT SERPL-CCNC: 23 U/L (ref 10–40)
ANION GAP SERPL CALCULATED.3IONS-SCNC: 11 MMOL/L (ref 4–16)
ANTIBODY SCREEN: NEGATIVE
APTT: 43.3 SECONDS (ref 25.1–37.1)
AST SERPL-CCNC: 46 IU/L (ref 15–37)
AST SERPL-CCNC: 46 IU/L (ref 15–37)
BASOPHILS ABSOLUTE: 0 K/CU MM
BASOPHILS RELATIVE PERCENT: 0.2 % (ref 0–1)
BILIRUB SERPL-MCNC: 1 MG/DL (ref 0–1)
BILIRUB SERPL-MCNC: 1 MG/DL (ref 0–1)
BILIRUBIN DIRECT: 0.4 MG/DL (ref 0–0.3)
BILIRUBIN, INDIRECT: 0.6 MG/DL (ref 0–0.7)
BUN BLDV-MCNC: 29 MG/DL (ref 6–23)
CALCIUM SERPL-MCNC: 8.6 MG/DL (ref 8.3–10.6)
CHLORIDE BLD-SCNC: 100 MMOL/L (ref 99–110)
CO2: 24 MMOL/L (ref 21–32)
CREAT SERPL-MCNC: 1 MG/DL (ref 0.9–1.3)
D DIMER: 438 NG/ML(DDU)
DIFFERENTIAL TYPE: ABNORMAL
EKG ATRIAL RATE: 101 BPM
EKG ATRIAL RATE: 99 BPM
EKG DIAGNOSIS: NORMAL
EKG DIAGNOSIS: NORMAL
EKG P AXIS: 39 DEGREES
EKG P AXIS: 9 DEGREES
EKG P-R INTERVAL: 124 MS
EKG P-R INTERVAL: 132 MS
EKG Q-T INTERVAL: 352 MS
EKG Q-T INTERVAL: 378 MS
EKG QRS DURATION: 120 MS
EKG QRS DURATION: 124 MS
EKG QTC CALCULATION (BAZETT): 451 MS
EKG QTC CALCULATION (BAZETT): 490 MS
EKG R AXIS: -11 DEGREES
EKG R AXIS: -12 DEGREES
EKG T AXIS: 2 DEGREES
EKG T AXIS: 3 DEGREES
EKG VENTRICULAR RATE: 101 BPM
EKG VENTRICULAR RATE: 99 BPM
EOSINOPHILS ABSOLUTE: 0 K/CU MM
EOSINOPHILS RELATIVE PERCENT: 0 % (ref 0–3)
FIBRINOGEN LEVEL: 629 MG/DL (ref 196.9–442.1)
GFR AFRICAN AMERICAN: >60 ML/MIN/1.73M2
GFR NON-AFRICAN AMERICAN: >60 ML/MIN/1.73M2
GLUCOSE BLD-MCNC: 147 MG/DL (ref 70–99)
HCT VFR BLD CALC: 43.6 % (ref 42–52)
HEMOGLOBIN: 14.7 GM/DL (ref 13.5–18)
IMMATURE NEUTROPHIL %: 1.2 % (ref 0–0.43)
INR BLD: 1.1 INDEX
LYMPHOCYTES ABSOLUTE: 0.6 K/CU MM
LYMPHOCYTES RELATIVE PERCENT: 16 % (ref 24–44)
MCH RBC QN AUTO: 30.9 PG (ref 27–31)
MCHC RBC AUTO-ENTMCNC: 33.7 % (ref 32–36)
MCV RBC AUTO: 91.8 FL (ref 78–100)
MONOCYTES ABSOLUTE: 0.3 K/CU MM
MONOCYTES RELATIVE PERCENT: 8.5 % (ref 0–4)
NUCLEATED RBC %: 0 %
PDW BLD-RTO: 12.6 % (ref 11.7–14.9)
PLATELET # BLD: 187 K/CU MM (ref 140–440)
PMV BLD AUTO: 10.9 FL (ref 7.5–11.1)
POTASSIUM SERPL-SCNC: 4.4 MMOL/L (ref 3.5–5.1)
PROTHROMBIN TIME: 13.3 SECONDS (ref 11.7–14.5)
RBC # BLD: 4.75 M/CU MM (ref 4.6–6.2)
SEGMENTED NEUTROPHILS ABSOLUTE COUNT: 3 K/CU MM
SEGMENTED NEUTROPHILS RELATIVE PERCENT: 74.1 % (ref 36–66)
SODIUM BLD-SCNC: 135 MMOL/L (ref 135–145)
TOTAL IMMATURE NEUTOROPHIL: 0.05 K/CU MM
TOTAL NUCLEATED RBC: 0 K/CU MM
TOTAL PROTEIN: 5.9 GM/DL (ref 6.4–8.2)
TOTAL PROTEIN: 5.9 GM/DL (ref 6.4–8.2)
WBC # BLD: 4 K/CU MM (ref 4–10.5)

## 2020-11-05 PROCEDURE — 85610 PROTHROMBIN TIME: CPT

## 2020-11-05 PROCEDURE — 82728 ASSAY OF FERRITIN: CPT

## 2020-11-05 PROCEDURE — 86900 BLOOD TYPING SEROLOGIC ABO: CPT

## 2020-11-05 PROCEDURE — 2580000003 HC RX 258: Performed by: STUDENT IN AN ORGANIZED HEALTH CARE EDUCATION/TRAINING PROGRAM

## 2020-11-05 PROCEDURE — 6360000002 HC RX W HCPCS: Performed by: STUDENT IN AN ORGANIZED HEALTH CARE EDUCATION/TRAINING PROGRAM

## 2020-11-05 PROCEDURE — 36430 TRANSFUSION BLD/BLD COMPNT: CPT

## 2020-11-05 PROCEDURE — 2500000003 HC RX 250 WO HCPCS: Performed by: STUDENT IN AN ORGANIZED HEALTH CARE EDUCATION/TRAINING PROGRAM

## 2020-11-05 PROCEDURE — 87899 AGENT NOS ASSAY W/OPTIC: CPT

## 2020-11-05 PROCEDURE — 86850 RBC ANTIBODY SCREEN: CPT

## 2020-11-05 PROCEDURE — 93010 ELECTROCARDIOGRAM REPORT: CPT | Performed by: INTERNAL MEDICINE

## 2020-11-05 PROCEDURE — 2700000000 HC OXYGEN THERAPY PER DAY

## 2020-11-05 PROCEDURE — 82248 BILIRUBIN DIRECT: CPT

## 2020-11-05 PROCEDURE — 80053 COMPREHEN METABOLIC PANEL: CPT

## 2020-11-05 PROCEDURE — 1200000000 HC SEMI PRIVATE

## 2020-11-05 PROCEDURE — 87205 SMEAR GRAM STAIN: CPT

## 2020-11-05 PROCEDURE — 6370000000 HC RX 637 (ALT 250 FOR IP): Performed by: STUDENT IN AN ORGANIZED HEALTH CARE EDUCATION/TRAINING PROGRAM

## 2020-11-05 PROCEDURE — 85730 THROMBOPLASTIN TIME PARTIAL: CPT

## 2020-11-05 PROCEDURE — 84145 PROCALCITONIN (PCT): CPT

## 2020-11-05 PROCEDURE — 86901 BLOOD TYPING SEROLOGIC RH(D): CPT

## 2020-11-05 PROCEDURE — 80048 BASIC METABOLIC PNL TOTAL CA: CPT

## 2020-11-05 PROCEDURE — 87449 NOS EACH ORGANISM AG IA: CPT

## 2020-11-05 PROCEDURE — 85025 COMPLETE CBC W/AUTO DIFF WBC: CPT

## 2020-11-05 PROCEDURE — 94761 N-INVAS EAR/PLS OXIMETRY MLT: CPT

## 2020-11-05 PROCEDURE — 85379 FIBRIN DEGRADATION QUANT: CPT

## 2020-11-05 PROCEDURE — 87070 CULTURE OTHR SPECIMN AEROBIC: CPT

## 2020-11-05 PROCEDURE — 85384 FIBRINOGEN ACTIVITY: CPT

## 2020-11-05 RX ORDER — SODIUM CHLORIDE 0.9 % (FLUSH) 0.9 %
10 SYRINGE (ML) INJECTION EVERY 12 HOURS SCHEDULED
Status: DISCONTINUED | OUTPATIENT
Start: 2020-11-05 | End: 2020-11-11 | Stop reason: HOSPADM

## 2020-11-05 RX ORDER — ACETAMINOPHEN 325 MG/1
650 TABLET ORAL EVERY 6 HOURS PRN
Status: DISCONTINUED | OUTPATIENT
Start: 2020-11-05 | End: 2020-11-11 | Stop reason: HOSPADM

## 2020-11-05 RX ORDER — 0.9 % SODIUM CHLORIDE 0.9 %
30 INTRAVENOUS SOLUTION INTRAVENOUS PRN
Status: DISCONTINUED | OUTPATIENT
Start: 2020-11-05 | End: 2020-11-07

## 2020-11-05 RX ORDER — 0.9 % SODIUM CHLORIDE 0.9 %
20 INTRAVENOUS SOLUTION INTRAVENOUS ONCE
Status: DISCONTINUED | OUTPATIENT
Start: 2020-11-05 | End: 2020-11-11 | Stop reason: HOSPADM

## 2020-11-05 RX ORDER — POLYETHYLENE GLYCOL 3350 17 G/17G
17 POWDER, FOR SOLUTION ORAL DAILY PRN
Status: DISCONTINUED | OUTPATIENT
Start: 2020-11-05 | End: 2020-11-11 | Stop reason: HOSPADM

## 2020-11-05 RX ORDER — ONDANSETRON 2 MG/ML
4 INJECTION INTRAMUSCULAR; INTRAVENOUS EVERY 6 HOURS PRN
Status: DISCONTINUED | OUTPATIENT
Start: 2020-11-05 | End: 2020-11-11 | Stop reason: HOSPADM

## 2020-11-05 RX ORDER — SODIUM CHLORIDE 0.9 % (FLUSH) 0.9 %
10 SYRINGE (ML) INJECTION PRN
Status: DISCONTINUED | OUTPATIENT
Start: 2020-11-05 | End: 2020-11-11 | Stop reason: HOSPADM

## 2020-11-05 RX ORDER — PROMETHAZINE HYDROCHLORIDE 25 MG/1
12.5 TABLET ORAL EVERY 6 HOURS PRN
Status: DISCONTINUED | OUTPATIENT
Start: 2020-11-05 | End: 2020-11-11 | Stop reason: HOSPADM

## 2020-11-05 RX ADMIN — SODIUM CHLORIDE, PRESERVATIVE FREE 10 ML: 5 INJECTION INTRAVENOUS at 20:00

## 2020-11-05 RX ADMIN — ENOXAPARIN SODIUM 30 MG: 30 INJECTION SUBCUTANEOUS at 04:06

## 2020-11-05 RX ADMIN — DEXAMETHASONE 6 MG: 2 TABLET ORAL at 19:53

## 2020-11-05 RX ADMIN — REMDESIVIR 200 MG: 5 INJECTION INTRAVENOUS at 04:11

## 2020-11-05 RX ADMIN — ENOXAPARIN SODIUM 30 MG: 30 INJECTION SUBCUTANEOUS at 20:00

## 2020-11-05 RX ADMIN — SODIUM CHLORIDE, PRESERVATIVE FREE 10 ML: 5 INJECTION INTRAVENOUS at 08:31

## 2020-11-05 RX ADMIN — ENOXAPARIN SODIUM 30 MG: 30 INJECTION SUBCUTANEOUS at 08:30

## 2020-11-05 ASSESSMENT — PAIN SCALES - GENERAL
PAINLEVEL_OUTOF10: 0

## 2020-11-05 NOTE — H&P
started today, decreased oral intake, body aches and back pain. Patient denies any visual changes, anosmia, sore throat, chest pain, abdominal pain, nausea, vomiting, or diarrhea. Patient denies any other symptoms at this time as well as denying history of smoking or COPD. Patient was noted to be hypoxemic down to 89% on room air and requiring 2 L per nasal cannula. Patient was noted to have a fever of 102 °F and was given Decadron in ED. Discussed case with ED provider. Review of Systems   Constitutional: Positive for appetite change, chills, fatigue and fever. Negative for diaphoresis. HENT: Negative for congestion, rhinorrhea and sore throat. Eyes: Negative for visual disturbance. Respiratory: Positive for cough and shortness of breath. Negative for chest tightness and wheezing. Cardiovascular: Negative for chest pain and palpitations. Gastrointestinal: Negative for abdominal pain, diarrhea, nausea and vomiting. Genitourinary: Negative for dysuria, frequency and urgency. Musculoskeletal: Positive for back pain and myalgias. Negative for arthralgias. Skin: Negative for color change and rash. Neurological: Positive for headaches. Negative for dizziness, seizures, weakness and numbness. Psychiatric/Behavioral: Negative for agitation. Objective:   No intake or output data in the 24 hours ending 11/04/20 2304   Vitals:   Vitals:    11/04/20 2249   BP:    Pulse:    Resp:    Temp: 99.9 °F (37.7 °C)   SpO2:      Physical Exam:   Physical Exam  Vitals signs and nursing note reviewed. Constitutional:       General: He is not in acute distress. Appearance: Normal appearance. He is overweight. He is not ill-appearing. Interventions: He is not intubated. Nasal cannula in place. HENT:      Head: Atraumatic. Right Ear: External ear normal.      Left Ear: External ear normal.      Nose: Nose normal. No rhinorrhea.       Mouth/Throat:      Mouth: Mucous membranes are moist. Eyes:      General: No scleral icterus. Conjunctiva/sclera: Conjunctivae normal.      Pupils: Pupils are equal, round, and reactive to light. Neck:      Musculoskeletal: Neck supple. Cardiovascular:      Rate and Rhythm: Regular rhythm. Tachycardia present. Pulses: Normal pulses. Heart sounds: Normal heart sounds. No murmur. No gallop. Pulmonary:      Effort: Pulmonary effort is normal. No tachypnea, bradypnea or respiratory distress. He is not intubated. Breath sounds: Examination of the right-lower field reveals rales. Examination of the left-lower field reveals rales. Rales present. No decreased breath sounds, wheezing or rhonchi. Comments: Mild conversationally dyspneic  Abdominal:      General: Abdomen is flat. There is no distension. Palpations: Abdomen is soft. Tenderness: There is no abdominal tenderness. There is no guarding or rebound. Musculoskeletal: Normal range of motion. Right lower leg: No edema. Left lower leg: No edema. Skin:     General: Skin is warm and dry. Capillary Refill: Capillary refill takes less than 2 seconds. Neurological:      General: No focal deficit present. Mental Status: He is alert and oriented to person, place, and time. Mental status is at baseline. Cranial Nerves: No cranial nerve deficit. Sensory: No sensory deficit. Psychiatric:         Attention and Perception: Attention normal.         Mood and Affect: Mood normal.         Speech: Speech normal. Speech is not slurred. Behavior: Behavior normal. Behavior is cooperative. Past Medical History:      Past Medical History:   Diagnosis Date    BPH (benign prostatic hyperplasia)     Cancer [facility])     Testicular    Herpes simplex of male genitalia     Urinary frequency      PSHX:  has a past surgical history that includes Testicle removal (Left); Tonsillectomy; Colonoscopy (11-22-13); and hernia repair (Left, 1978).     Allergies: No Known Allergies    FAM HX: family history includes Cancer in his father; Heart Disease in his father and mother. Soc HX:   Social History     Socioeconomic History    Marital status:      Spouse name: Not on file    Number of children: Not on file    Years of education: Not on file    Highest education level: Not on file   Occupational History    Not on file   Social Needs    Financial resource strain: Not on file    Food insecurity     Worry: Not on file     Inability: Not on file    Transportation needs     Medical: Not on file     Non-medical: Not on file   Tobacco Use    Smoking status: Never Smoker    Smokeless tobacco: Never Used   Substance and Sexual Activity    Alcohol use:  Yes     Alcohol/week: 1.0 standard drinks     Types: 1 Cans of beer per week    Drug use: No    Sexual activity: Never   Lifestyle    Physical activity     Days per week: Not on file     Minutes per session: Not on file    Stress: Not on file   Relationships    Social connections     Talks on phone: Not on file     Gets together: Not on file     Attends Holiness service: Not on file     Active member of club or organization: Not on file     Attends meetings of clubs or organizations: Not on file     Relationship status: Not on file    Intimate partner violence     Fear of current or ex partner: Not on file     Emotionally abused: Not on file     Physically abused: Not on file     Forced sexual activity: Not on file   Other Topics Concern    Not on file   Social History Narrative    Not on file       Medications:   Medications:    Infusions:   PRN Meds:     Electronically signed by Alie Gonzáles DO on 11/4/2020 at 11:04 PM

## 2020-11-05 NOTE — CARE COORDINATION
Pt on Covid unit. CM called into Pt room to discuss discharge planning. Pt from home with wife, independent with ADL's, and has no DME. Pt has insurance and pcp. Pt denies any needs at this time. CM available if needs arise.

## 2020-11-05 NOTE — PROGRESS NOTES
auscultated. Regular rate without appreciable murmurs, rubs, or gallops. No JVD or carotid bruits. Peripheral pulses equal bilaterally and palpable. No peripheral edema. GI Abdomen is soft without significant tenderness, masses, or guarding. Bowel sounds are normoactive. Rectal exam deferred.  No costovertebral angle tenderness. HEME/LYMPH No palpable cervical lymphadenopathy and no hepatosplenomegaly. No petechiae or ecchymoses. MSK No gross joint deformities. SKIN Normal coloration, warm, dry. NEURO Cranial nerves appear grossly intact, normal speech, no lateralizing weakness. PSYCH Awake, alert, oriented x 4. Affect appropriate.     Medications:   Medications:    enoxaparin  30 mg Subcutaneous BID    sodium chloride flush  10 mL Intravenous 2 times per day    sodium chloride  20 mL Intravenous Once    [START ON 11/6/2020] remdesivir IVPB  100 mg Intravenous Q24H    dexamethasone  6 mg Oral Daily      Infusions:   PRN Meds: acetaminophen, 650 mg, Q6H PRN    Or  acetaminophen, 650 mg, Q6H PRN  sodium chloride flush, 10 mL, PRN  polyethylene glycol, 17 g, Daily PRN  promethazine, 12.5 mg, Q6H PRN    Or  ondansetron, 4 mg, Q6H PRN  sodium chloride, 30 mL, PRN          Electronically signed by Sanjay Chung MD on 11/5/2020 at 3:56 PM

## 2020-11-05 NOTE — PROGRESS NOTES
Skin assessment completed with stephane RN. Pt skin is warm dry and intact.   There are red areas from pt belt around pt waist.

## 2020-11-05 NOTE — PROGRESS NOTES
Comprehensive Nutrition Assessment    Type and Reason for Visit:  Initial, Positive Nutrition Screen(wt loss, poor intake)    Nutrition Recommendations/Plan:   · Continue General Diet  · Begin low tim high protein oral nutrition supplement bid, between meals    Nutrition Assessment:  Pt admitted with acute respiraory distress, Covid-19. Reports poor appetite/intake 2/2 dysgeusia followed by [facility]. Recent wt loss noted. Pt meets criteria for acute moderate malnutrition. Will order oral supplement to optimize intake dos and continue to follow pt as moderate nutrition risk. Malnutrition Assessment:  Malnutrition Status: Moderate malnutrition    Context:  Acute Illness     Findings of the 6 clinical characteristics of malnutrition:  Energy Intake:  75% or less of estimated energy requirements for 7 or more days  Weight Loss:  Greater than 5% over 1 month     Body Fat Loss:  Unable to assess     Muscle Mass Loss:  Unable to assess    Fluid Accumulation:  No significant fluid accumulation     Strength:  Not Performed    Estimated Daily Nutrient Needs:  Energy (kcal):  3408-1631 ([place]); Weight Used for Energy Requirements:  Current     Protein (g):   (1-1.2 g/kg IBW); Weight Used for Protein Requirements:  Ideal        Fluid (ml/day):  1982-4041 (1 ml/kcal); Method Used for Fluid Requirements:  1 ml/kcal      Wounds:  None       Current Nutrition Therapies:    DIET GENERAL    Anthropometric Measures:  · Height: 6' 1\" (185.4 cm)  · Current Body Weight: 230 lb (104.3 kg)   · Admission Body Weight: 228 lb 9.6 oz (103.7 kg)    · Usual Body Weight: 244 lb 6.4 oz (110.9 kg)(10/15/20)     · Ideal Body Weight: 184 lbs; % Ideal Body Weight 125 %   · BMI: 30.4  · BMI Categories: Obese Class 1 (BMI 30.0-34. 9)       Nutrition Diagnosis:   · Moderate malnutrition, In context of acute illness or injury related to altered taste perception, inadequate protein-energy intake as evidenced by poor intake prior to admission, weight loss greater than or equal to 5% in 1 month    Nutrition Interventions:   Food and/or Nutrient Delivery:  Continue Current Diet, Start Oral Nutrition Supplement  Nutrition Education/Counseling:  Education not appropriate   Coordination of Nutrition Care:  Continue to monitor while inpatient    Goals:  Pt will consume greater than 50% of his meals and supplements       Nutrition Monitoring and Evaluation:   Food/Nutrient Intake Outcomes:  Food and Nutrient Intake, Supplement Intake  Physical Signs/Symptoms Outcomes:  Biochemical Data, Meal Time Behavior, Weight     Discharge Planning:    No discharge needs at this time     Electronically signed by Keshia Encarnacion RD, LD on 11/5/20 at 10:24 AM EST    Contact: [phone]

## 2020-11-05 NOTE — PLAN OF CARE
cramping  Outcome: Ongoing  Goal: Maintain normal serum potassium, sodium, calcium, phosphorus, and pH  Outcome: Ongoing     Problem: Loneliness or Risk for Loneliness  Goal: Demonstrate positive use of time alone when socialization is not possible  Outcome: Ongoing     Problem: Fatigue  Goal: Verbalize increase energy and improved vitality  Outcome: Ongoing     Problem: Patient Education: Go to Patient Education Activity  Goal: Patient/Family Education  Outcome: Ongoing

## 2020-11-05 NOTE — ED PROVIDER NOTES
Triage Chief Complaint:   Nausea (Pt stated he tested postive on Friday 103020) and Fatigue    [place]hto:  Farrukh Mchugh is a 62 y.o. male that presents with worsening shortness of breath and cough over the past few days. He recently tested positive for Covid. He has had symptoms for about 9 days that started with some nausea, poor oral intake and general fatigue. Patient is not a smoker and does not have history of COPD. He does not use oxygen at home. Denies sick contacts or recent travel. Denies having any abdominal pain or chest pain. ROS:  At least 14 systems reviewed and otherwise acutely negative except as in the [address] Past Medical History:   Diagnosis Date    BPH (benign prostatic hyperplasia)     Cancer (HCC)     Testicular    Herpes simplex of male genitalia     Urinary frequency      Past Surgical History:   Procedure Laterality Date    COLONOSCOPY  11-22-13    poly's    HERNIA REPAIR Left 1978    inguinal    TESTICLE REMOVAL Left     TONSILLECTOMY       Family History   Problem Relation Age of Onset    Heart Disease Mother     Heart Disease Father     Cancer Father         prostate     Social History     Socioeconomic History    Marital status:      Spouse name: Not on file    Number of children: Not on file    Years of education: Not on file    Highest education level: Not on file   Occupational History    Not on file   Social Needs    Financial resource strain: Not on file    Food insecurity     Worry: Not on file     Inability: Not on file    Transportation needs     Medical: Not on file     Non-medical: Not on file   Tobacco Use    Smoking status: Never Smoker    Smokeless tobacco: Never Used   Substance and Sexual Activity    Alcohol use:  Yes     Alcohol/week: 1.0 standard drinks     Types: 1 Cans of beer per week    Drug use: No    Sexual activity: Never   Lifestyle    Physical activity     Days per week: Not on file     Minutes per session: Not on file    Stress: Not on file   Relationships    Social connections     Talks on phone: Not on file     Gets together: Not on file     Attends Cheondoism service: Not on file     Active member of club or organization: Not on file     Attends meetings of clubs or organizations: Not on file     Relationship status: Not on file    Intimate partner violence     Fear of current or ex partner: Not on file     Emotionally abused: Not on file     Physically abused: Not on file     Forced sexual activity: Not on file   Other Topics Concern    Not on file   Social History Narrative    Not on file     Current Facility-Administered Medications   Medication Dose Route Frequency Provider Last Rate Last Dose    [START ON 11/5/2020] dexamethasone (DECADRON) tablet 6 mg  6 mg Oral Daily Floyde Mabel, DO         Current Outpatient Medications   Medication Sig Dispense Refill    valACYclovir (VALTREX) 500 MG tablet Take 1 tablet by mouth daily 90 tablet 3    Multiple Vitamins-Minerals (THERAPEUTIC MULTIVITAMIN-MINERALS) tablet Take 1 tablet by mouth daily. No Known Allergies    Nursing Notes Reviewed    Physical Exam:  ED Triage Vitals [11/04/20 2104]   Enc Vitals Group      /62      Pulse 106      Resp 17      Temp 102.2 °F (39 °C)      Temp src       SpO2 96 %      Weight 225 lb (102.1 kg)      Height 6' 1\" (1.854 m)      Head Circumference       Peak Flow       Pain Score       Pain Loc       Pain Edu? Excl. in [address]? GENERAL APPEARANCE: Awake and alert. Cooperative. No acute distress. HEAD: Normocephalic. Atraumatic. EYES: EOM's grossly intact. Sclera anicteric. ENT: Mucous membranes are moist. Tolerates saliva. No trismus. NECK: No meningismus. HEART:  Extremities pink  LUNGS: Respirations unlabored. Even chest rise bilaterally  ABDOMEN: Non distended. EXTREMITIES: No acute deformities. SKIN: Dry  NEUROLOGICAL: No gross facial drooping. Moves all 4 extremities spontaneously.   PSYCHIATRIC: Normal mood.    I have reviewed and interpreted all of the currently available lab results from this visit (if applicable):  Results for orders placed or performed during the hospital encounter of 11/04/20   CBC Auto Differential   Result Value Ref Range    WBC 4.8 4.0 - 10.5 K/CU MM    RBC 4.72 4.6 - 6.2 M/CU MM    Hemoglobin 14.6 13.5 - 18.0 GM/DL    Hematocrit 43.6 42 - 52 %    MCV 92.4 78 - 100 FL    MCH 30.9 27 - 31 PG    MCHC 33.5 32.0 - 36.0 %    RDW 12.4 11.7 - 14.9 %    Platelets 504 578 - 251 K/CU MM    MPV 10.9 7.5 - 11.1 FL    Bands Relative 15 (H) 5 - 11 %    Segs Relative 63.0 36 - 66 %    Lymphocytes % 14.0 (L) 24 - 44 %    Monocytes % 8.0 (H) 0 - 4 %    Bands Absolute 0.72 K/CU MM    Segs Absolute 3.0 K/CU MM    Lymphocytes Absolute 0.7 K/CU MM    Monocytes Absolute 0.4 K/CU MM    Differential Type MANUAL DIFFERENTIAL     Giant PLTs PRESENT  PRESENT      Comprehensive Metabolic Panel w/ Reflex to MG   Result Value Ref Range    Sodium 134 (L) 135 - 145 MMOL/L    Potassium 4.2 3.5 - 5.1 MMOL/L    Chloride 96 (L) 99 - 110 mMol/L    CO2 25 21 - 32 MMOL/L    BUN 28 (H) 6 - 23 MG/DL    CREATININE 1.1 0.9 - 1.3 MG/DL    Glucose 129 (H) 70 - 99 MG/DL    Calcium 8.7 8.3 - 10.6 MG/DL    Alb 3.4 3.4 - 5.0 GM/DL    Total Protein 6.4 6.4 - 8.2 GM/DL    Total Bilirubin 0.9 0.0 - 1.0 MG/DL    ALT 23 10 - 40 U/L    AST 46 (H) 15 - 37 IU/L    Alkaline Phosphatase 57 40 - 129 IU/L    GFR Non-African American >60 >60 mL/min/1.73m2    GFR African American >60 >60 mL/min/1.73m2    Anion Gap 13 4 - 16   Lipase   Result Value Ref Range    Lipase 20 13 - 60 IU/L   Troponin   Result Value Ref Range    Troponin T <0.010 <0.01 NG/ML   Lactic Acid, Plasma   Result Value Ref Range    Lactate 1.5 0.4 - 2.0 mMOL/L   EKG 12 Lead   Result Value Ref Range    Ventricular Rate 101 BPM    Atrial Rate 101 BPM    P-R Interval 124 ms    QRS Duration 120 ms    Q-T Interval 378 ms    QTc Calculation (Bazett) 490 ms    P Axis 9 degrees    R Axis -12 degrees    T Axis 3 degrees    Diagnosis       Sinus tachycardia  Right bundle branch block  Abnormal ECG  No previous ECGs available        Radiographs (if obtained):  [] The following radiograph was interpreted by myself in the absence of a radiologist:  [x] Radiologist's Report Reviewed:    EKG (if obtained): (All EKG's are interpreted by myself in the absence of a cardiologist)  12 lead EKG as interpreted by me reveals sinus rhythm. Axis is normal. There are no ischemic ST elevations or other suspicious ST changes;  QRS interval consistent with a RBBB pattern, QT interval is not prolonged. Final Interpretation: Sinus Rhythm with RBBB. MDM:  Plan of care is discussed thoroughly with the patient and family if present. If performed, all imaging and lab work also discussed with patient. All relevant prior results and chart reviewed if available. Patient presents as above. He is in no acute distress. He presents with fever and slight hypoxia at 89% on room air and was placed on 2 L nasal cannula. He has recent diagnosis of Covid. Patient started on Decadron. Chest x-ray does not show any evidence of consolidative process. Metabolic work-up is unremarkable. Lactate is within normal limits. Patient will be admitted to hospitalist for further management. He is agreeable with this plan of care. Clinical Impression:  1.  Pneumonia due to COVID-19 virus      (Please note that portions of this note may have been completed with a voice recognition program. Efforts were made to edit the dictations but occasionally words are mis-transcribed.)    MD Marjan Jackson MD  11/04/20 1032

## 2020-11-06 LAB
ALBUMIN SERPL-MCNC: 3.6 GM/DL (ref 3.4–5)
ALBUMIN SERPL-MCNC: 3.6 GM/DL (ref 3.4–5)
ALP BLD-CCNC: 60 IU/L (ref 40–128)
ALP BLD-CCNC: 60 IU/L (ref 40–129)
ALT SERPL-CCNC: 25 U/L (ref 10–40)
ALT SERPL-CCNC: 25 U/L (ref 10–40)
ANION GAP SERPL CALCULATED.3IONS-SCNC: 12 MMOL/L (ref 4–16)
APTT: 34.8 SECONDS (ref 25.1–37.1)
AST SERPL-CCNC: 39 IU/L (ref 15–37)
AST SERPL-CCNC: 39 IU/L (ref 15–37)
BASOPHILS ABSOLUTE: 0 K/CU MM
BASOPHILS RELATIVE PERCENT: 0.2 % (ref 0–1)
BILIRUB SERPL-MCNC: 0.7 MG/DL (ref 0–1)
BILIRUB SERPL-MCNC: 0.7 MG/DL (ref 0–1)
BILIRUBIN DIRECT: 0.2 MG/DL (ref 0–0.3)
BILIRUBIN, INDIRECT: 0.5 MG/DL (ref 0–0.7)
BUN BLDV-MCNC: 25 MG/DL (ref 6–23)
CALCIUM SERPL-MCNC: 9 MG/DL (ref 8.3–10.6)
CHLORIDE BLD-SCNC: 101 MMOL/L (ref 99–110)
CO2: 25 MMOL/L (ref 21–32)
COMPONENT: NORMAL
COMPONENT: NORMAL
CREAT SERPL-MCNC: 0.7 MG/DL (ref 0.9–1.3)
D DIMER: 368 NG/ML(DDU)
DIFFERENTIAL TYPE: ABNORMAL
EOSINOPHILS ABSOLUTE: 0 K/CU MM
EOSINOPHILS RELATIVE PERCENT: 0 % (ref 0–3)
FERRITIN: 2264 NG/ML (ref 30–400)
FIBRINOGEN LEVEL: 596 MG/DL (ref 196.9–442.1)
GFR AFRICAN AMERICAN: >60 ML/MIN/1.73M2
GFR NON-AFRICAN AMERICAN: >60 ML/MIN/1.73M2
GLUCOSE BLD-MCNC: 178 MG/DL (ref 70–99)
HCT VFR BLD CALC: 44.6 % (ref 42–52)
HEMOGLOBIN: 14.3 GM/DL (ref 13.5–18)
IMMATURE NEUTROPHIL %: 1.2 % (ref 0–0.43)
INR BLD: 1.07 INDEX
LEGIONELLA URINARY AG: NEGATIVE
LYMPHOCYTES ABSOLUTE: 0.8 K/CU MM
LYMPHOCYTES RELATIVE PERCENT: 13.8 % (ref 24–44)
Lab: 1
MCH RBC QN AUTO: 29.9 PG (ref 27–31)
MCHC RBC AUTO-ENTMCNC: 32.1 % (ref 32–36)
MCV RBC AUTO: 93.1 FL (ref 78–100)
MONOCYTES ABSOLUTE: 0.5 K/CU MM
MONOCYTES RELATIVE PERCENT: 8.1 % (ref 0–4)
NUCLEATED RBC %: 0 %
PDW BLD-RTO: 12.2 % (ref 11.7–14.9)
PLATELET # BLD: 207 K/CU MM (ref 140–440)
PMV BLD AUTO: 11.5 FL (ref 7.5–11.1)
POTASSIUM SERPL-SCNC: 4.4 MMOL/L (ref 3.5–5.1)
PROCALCITONIN: 0.11
PROTHROMBIN TIME: 13 SECONDS (ref 11.7–14.5)
RBC # BLD: 4.79 M/CU MM (ref 4.6–6.2)
SEGMENTED NEUTROPHILS ABSOLUTE COUNT: 4.4 K/CU MM
SEGMENTED NEUTROPHILS RELATIVE PERCENT: 76.7 % (ref 36–66)
SODIUM BLD-SCNC: 138 MMOL/L (ref 135–145)
STATUS: NORMAL
STREP PNEUMONIAE ANTIGEN: NORMAL
TOTAL IMMATURE NEUTOROPHIL: 0.07 K/CU MM
TOTAL NUCLEATED RBC: 0 K/CU MM
TOTAL PROTEIN: 6.1 GM/DL (ref 6.4–8.2)
TOTAL PROTEIN: 6.1 GM/DL (ref 6.4–8.2)
TRANSFUSION STATUS: NORMAL
UNIT DIVISION: NORMAL
UNIT NUMBER: NORMAL
WBC # BLD: 5.8 K/CU MM (ref 4–10.5)

## 2020-11-06 PROCEDURE — 94761 N-INVAS EAR/PLS OXIMETRY MLT: CPT

## 2020-11-06 PROCEDURE — 2580000003 HC RX 258: Performed by: STUDENT IN AN ORGANIZED HEALTH CARE EDUCATION/TRAINING PROGRAM

## 2020-11-06 PROCEDURE — 85610 PROTHROMBIN TIME: CPT

## 2020-11-06 PROCEDURE — 80048 BASIC METABOLIC PNL TOTAL CA: CPT

## 2020-11-06 PROCEDURE — 80053 COMPREHEN METABOLIC PANEL: CPT

## 2020-11-06 PROCEDURE — 2700000000 HC OXYGEN THERAPY PER DAY

## 2020-11-06 PROCEDURE — 85379 FIBRIN DEGRADATION QUANT: CPT

## 2020-11-06 PROCEDURE — 82248 BILIRUBIN DIRECT: CPT

## 2020-11-06 PROCEDURE — 6370000000 HC RX 637 (ALT 250 FOR IP): Performed by: STUDENT IN AN ORGANIZED HEALTH CARE EDUCATION/TRAINING PROGRAM

## 2020-11-06 PROCEDURE — 85025 COMPLETE CBC W/AUTO DIFF WBC: CPT

## 2020-11-06 PROCEDURE — 85384 FIBRINOGEN ACTIVITY: CPT

## 2020-11-06 PROCEDURE — 6360000002 HC RX W HCPCS: Performed by: STUDENT IN AN ORGANIZED HEALTH CARE EDUCATION/TRAINING PROGRAM

## 2020-11-06 PROCEDURE — 1200000000 HC SEMI PRIVATE

## 2020-11-06 PROCEDURE — 85730 THROMBOPLASTIN TIME PARTIAL: CPT

## 2020-11-06 PROCEDURE — 2500000003 HC RX 250 WO HCPCS: Performed by: STUDENT IN AN ORGANIZED HEALTH CARE EDUCATION/TRAINING PROGRAM

## 2020-11-06 RX ADMIN — SODIUM CHLORIDE, PRESERVATIVE FREE 10 ML: 5 INJECTION INTRAVENOUS at 20:51

## 2020-11-06 RX ADMIN — ENOXAPARIN SODIUM 30 MG: 30 INJECTION SUBCUTANEOUS at 08:27

## 2020-11-06 RX ADMIN — ENOXAPARIN SODIUM 30 MG: 30 INJECTION SUBCUTANEOUS at 20:51

## 2020-11-06 RX ADMIN — REMDESIVIR 100 MG: 5 INJECTION INTRAVENOUS at 06:51

## 2020-11-06 RX ADMIN — SODIUM CHLORIDE, PRESERVATIVE FREE 10 ML: 5 INJECTION INTRAVENOUS at 08:27

## 2020-11-06 RX ADMIN — DEXAMETHASONE 6 MG: 2 TABLET ORAL at 08:26

## 2020-11-06 ASSESSMENT — PAIN SCALES - GENERAL
PAINLEVEL_OUTOF10: 0

## 2020-11-06 NOTE — PLAN OF CARE
Problem: Infection:  Goal: Will remain free from infection  Description: Will remain free from infection  Outcome: Ongoing     Problem: Safety:  Goal: Free from accidental physical injury  Description: Free from accidental physical injury  Outcome: Ongoing  Goal: Free from intentional harm  Description: Free from intentional harm  Outcome: Ongoing     Problem: Daily Care:  Goal: Daily care needs are met  Description: Daily care needs are met  Outcome: Ongoing     Problem: Pain:  Goal: Patient's pain/discomfort is manageable  Description: Patient's pain/discomfort is manageable  Outcome: Ongoing     Problem: Skin Integrity:  Goal: Skin integrity will stabilize  Description: Skin integrity will stabilize  Outcome: Ongoing     Problem: Discharge Planning:  Goal: Patients continuum of care needs are met  Description: Patients continuum of care needs are met  Outcome: Ongoing     Problem: Airway Clearance - Ineffective  Goal: Achieve or maintain patent airway  Outcome: Ongoing     Problem: Gas Exchange - Impaired  Goal: Absence of hypoxia  Outcome: Ongoing  Goal: Promote optimal lung function  Outcome: Ongoing     Problem: Breathing Pattern - Ineffective  Goal: Ability to achieve and maintain a regular respiratory rate  Outcome: Ongoing     Problem:  Body Temperature -  Risk of, Imbalanced  Goal: Ability to maintain a body temperature within defined limits  Outcome: Ongoing  Goal: Will regain or maintain usual level of consciousness  Outcome: Ongoing  Goal: Complications related to the disease process, condition or treatment will be avoided or minimized  Outcome: Ongoing     Problem: Isolation Precautions - Risk of Spread of Infection  Goal: Prevent transmission of infection  Outcome: Ongoing     Problem: Nutrition Deficits  Goal: Optimize nutrtional status  Outcome: Ongoing     Problem: Risk for Fluid Volume Deficit  Goal: Maintain normal heart rhythm  Outcome: Ongoing  Goal: Maintain absence of muscle cramping  Outcome: Ongoing  Goal: Maintain normal serum potassium, sodium, calcium, phosphorus, and pH  Outcome: Ongoing     Problem: Loneliness or Risk for Loneliness  Goal: Demonstrate positive use of time alone when socialization is not possible  Outcome: Ongoing     Problem: Fatigue  Goal: Verbalize increase energy and improved vitality  Outcome: Ongoing     Problem: Patient Education: Go to Patient Education Activity  Goal: Patient/Family Education  Outcome: Ongoing

## 2020-11-06 NOTE — PROGRESS NOTES
Hospitalist Progress Note      Name:  Cecilia Trotter /Age/Sex: 1960  (62 y.o. male)   MRN & CSN:  [id number] & [id number] Admission Date/Time: 2020  9:06 PM   Location:  [address] [place] [address] PCP: Albina Fulton MD         Hospital Day: 3    Assessment and Plan:   Cecilia Trotter is a 62 y.o.  male  who presents with -V acute respiratory disease.        #Acute respiratory failure hypoxemic  #COVID-19 pneumonia  -Patient currently on 2.5LL nasal cannula  -Continue remdesivir, Decadron, convalescent plasma  -Continue symptomatic management  -Oxygen wean off as tolerated     Other chronic medical conditions:  · Denies any other past medical history or medication use        Diet DIET GENERAL;  Dietary Nutrition Supplements: Low Calorie High Protein Supplement   DVT Prophylaxis [] Lovenox, []  Heparin, [] SCDs, [] Ambulation   GI Prophylaxis [] PPI,  [] H2 Blocker,  [] Carafate,  [] Diet/Tube Feeds   Code Status Full Code   Disposition  continued admission due to ARF         History of Present Illness:     Chief Complaint:  acute respiratory disease  eCcilia Trotter is a 62 y.o.  male  who presents with ARF       Ten point ROS reviewed negative, unless as noted above    Objective: Intake/Output Summary (Last 24 hours) at 2020 1554  Last data filed at 2020 0826  Gross per 24 hour   Intake 462 ml   Output 675 ml   Net -213 ml      Vitals:   Vitals:    20 1445   BP: 119/64   Pulse: 88   Resp: (!) 1   Temp: 97.8 °F (36.6 °C)   SpO2: 91%     Physical Exam:   GEN Awake male, sitting upright in bed in no apparent distress. Appears given age. EYES Pupils are equally round. No scleral erythema, discharge, or conjunctivitis. HENT Mucous membranes are moist. Oral pharynx without exudates, no evidence of thrush. NECK Supple, no apparent thyromegaly or masses. RESP Clear to auscultation, no wheezes, rales or rhonchi. Symmetric chest movement  CARDIO/VASC S1/S2 auscultated.  Regular rate

## 2020-11-07 LAB
ALBUMIN SERPL-MCNC: 3.5 GM/DL (ref 3.4–5)
ALBUMIN SERPL-MCNC: 3.5 GM/DL (ref 3.4–5)
ALP BLD-CCNC: 52 IU/L (ref 40–128)
ALP BLD-CCNC: 52 IU/L (ref 40–129)
ALT SERPL-CCNC: 23 U/L (ref 10–40)
ALT SERPL-CCNC: 23 U/L (ref 10–40)
ANION GAP SERPL CALCULATED.3IONS-SCNC: 8 MMOL/L (ref 4–16)
APTT: 30.9 SECONDS (ref 25.1–37.1)
AST SERPL-CCNC: 29 IU/L (ref 15–37)
AST SERPL-CCNC: 29 IU/L (ref 15–37)
BASOPHILS ABSOLUTE: 0 K/CU MM
BASOPHILS RELATIVE PERCENT: 0.1 % (ref 0–1)
BILIRUB SERPL-MCNC: 0.5 MG/DL (ref 0–1)
BILIRUB SERPL-MCNC: 0.5 MG/DL (ref 0–1)
BILIRUBIN DIRECT: 0.2 MG/DL (ref 0–0.3)
BILIRUBIN, INDIRECT: 0.3 MG/DL (ref 0–0.7)
BUN BLDV-MCNC: 26 MG/DL (ref 6–23)
CALCIUM SERPL-MCNC: 8.4 MG/DL (ref 8.3–10.6)
CHLORIDE BLD-SCNC: 104 MMOL/L (ref 99–110)
CO2: 27 MMOL/L (ref 21–32)
COMMENT: ABNORMAL
CREAT SERPL-MCNC: 0.7 MG/DL (ref 0.9–1.3)
D DIMER: 226 NG/ML(DDU)
DIFFERENTIAL TYPE: ABNORMAL
EOSINOPHILS ABSOLUTE: 0 K/CU MM
EOSINOPHILS RELATIVE PERCENT: 0 % (ref 0–3)
FIBRINOGEN LEVEL: 448 MG/DL (ref 196.9–442.1)
GFR AFRICAN AMERICAN: >60 ML/MIN/1.73M2
GFR NON-AFRICAN AMERICAN: >60 ML/MIN/1.73M2
GLUCOSE BLD-MCNC: 155 MG/DL (ref 70–99)
HCT VFR BLD CALC: 44 % (ref 42–52)
HEMOGLOBIN: 14.3 GM/DL (ref 13.5–18)
IMMATURE NEUTROPHIL %: 1.8 % (ref 0–0.43)
INR BLD: 1.13 INDEX
LYMPHOCYTES ABSOLUTE: 0.8 K/CU MM
LYMPHOCYTES RELATIVE PERCENT: 9 % (ref 24–44)
MCH RBC QN AUTO: 30.6 PG (ref 27–31)
MCHC RBC AUTO-ENTMCNC: 32.5 % (ref 32–36)
MCV RBC AUTO: 94.2 FL (ref 78–100)
MONOCYTES ABSOLUTE: 0.9 K/CU MM
MONOCYTES RELATIVE PERCENT: 9.9 % (ref 0–4)
PDW BLD-RTO: 12.3 % (ref 11.7–14.9)
PLATELET # BLD: 265 K/CU MM (ref 140–440)
PMV BLD AUTO: 11.5 FL (ref 7.5–11.1)
POTASSIUM SERPL-SCNC: 5 MMOL/L (ref 3.5–5.1)
PROTHROMBIN TIME: 13.7 SECONDS (ref 11.7–14.5)
RBC # BLD: 4.67 M/CU MM (ref 4.6–6.2)
SEGMENTED NEUTROPHILS ABSOLUTE COUNT: 7.5 K/CU MM
SEGMENTED NEUTROPHILS RELATIVE PERCENT: 79.2 % (ref 36–66)
SODIUM BLD-SCNC: 139 MMOL/L (ref 135–145)
TOTAL IMMATURE NEUTOROPHIL: 0.17 K/CU MM
TOTAL PROTEIN: 5.4 GM/DL (ref 6.4–8.2)
TOTAL PROTEIN: 5.4 GM/DL (ref 6.4–8.2)
WBC # BLD: 9.4 K/CU MM (ref 4–10.5)

## 2020-11-07 PROCEDURE — 6370000000 HC RX 637 (ALT 250 FOR IP): Performed by: STUDENT IN AN ORGANIZED HEALTH CARE EDUCATION/TRAINING PROGRAM

## 2020-11-07 PROCEDURE — 6360000002 HC RX W HCPCS: Performed by: STUDENT IN AN ORGANIZED HEALTH CARE EDUCATION/TRAINING PROGRAM

## 2020-11-07 PROCEDURE — 85379 FIBRIN DEGRADATION QUANT: CPT

## 2020-11-07 PROCEDURE — 85610 PROTHROMBIN TIME: CPT

## 2020-11-07 PROCEDURE — 85025 COMPLETE CBC W/AUTO DIFF WBC: CPT

## 2020-11-07 PROCEDURE — 6360000002 HC RX W HCPCS: Performed by: INTERNAL MEDICINE

## 2020-11-07 PROCEDURE — 2580000003 HC RX 258: Performed by: INTERNAL MEDICINE

## 2020-11-07 PROCEDURE — 85384 FIBRINOGEN ACTIVITY: CPT

## 2020-11-07 PROCEDURE — 2580000003 HC RX 258: Performed by: STUDENT IN AN ORGANIZED HEALTH CARE EDUCATION/TRAINING PROGRAM

## 2020-11-07 PROCEDURE — 6370000000 HC RX 637 (ALT 250 FOR IP): Performed by: INTERNAL MEDICINE

## 2020-11-07 PROCEDURE — 1200000000 HC SEMI PRIVATE

## 2020-11-07 PROCEDURE — 85730 THROMBOPLASTIN TIME PARTIAL: CPT

## 2020-11-07 PROCEDURE — 80048 BASIC METABOLIC PNL TOTAL CA: CPT

## 2020-11-07 PROCEDURE — 80053 COMPREHEN METABOLIC PANEL: CPT

## 2020-11-07 PROCEDURE — 82248 BILIRUBIN DIRECT: CPT

## 2020-11-07 PROCEDURE — 2500000003 HC RX 250 WO HCPCS: Performed by: STUDENT IN AN ORGANIZED HEALTH CARE EDUCATION/TRAINING PROGRAM

## 2020-11-07 RX ORDER — AZITHROMYCIN 250 MG/1
250 TABLET, FILM COATED ORAL DAILY
Status: COMPLETED | OUTPATIENT
Start: 2020-11-08 | End: 2020-11-11

## 2020-11-07 RX ORDER — ALBUTEROL SULFATE 90 UG/1
2 AEROSOL, METERED RESPIRATORY (INHALATION) 4 TIMES DAILY
Status: DISCONTINUED | OUTPATIENT
Start: 2020-11-07 | End: 2020-11-11 | Stop reason: HOSPADM

## 2020-11-07 RX ORDER — AZITHROMYCIN 250 MG/1
500 TABLET, FILM COATED ORAL ONCE
Status: COMPLETED | OUTPATIENT
Start: 2020-11-07 | End: 2020-11-07

## 2020-11-07 RX ORDER — AZITHROMYCIN 250 MG/1
250 TABLET, FILM COATED ORAL DAILY
Status: DISCONTINUED | OUTPATIENT
Start: 2020-11-07 | End: 2020-11-07 | Stop reason: ALTCHOICE

## 2020-11-07 RX ORDER — BENZONATATE 100 MG/1
100 CAPSULE ORAL 3 TIMES DAILY PRN
Status: DISCONTINUED | OUTPATIENT
Start: 2020-11-07 | End: 2020-11-11 | Stop reason: HOSPADM

## 2020-11-07 RX ADMIN — CEFTRIAXONE 1 G: 1 INJECTION, POWDER, FOR SOLUTION INTRAMUSCULAR; INTRAVENOUS at 16:15

## 2020-11-07 RX ADMIN — AZITHROMYCIN MONOHYDRATE 500 MG: 250 TABLET ORAL at 16:12

## 2020-11-07 RX ADMIN — REMDESIVIR 100 MG: 5 INJECTION INTRAVENOUS at 09:57

## 2020-11-07 RX ADMIN — SODIUM CHLORIDE, PRESERVATIVE FREE 10 ML: 5 INJECTION INTRAVENOUS at 21:51

## 2020-11-07 RX ADMIN — SODIUM CHLORIDE, PRESERVATIVE FREE 10 ML: 5 INJECTION INTRAVENOUS at 09:58

## 2020-11-07 RX ADMIN — DEXAMETHASONE 6 MG: 2 TABLET ORAL at 09:58

## 2020-11-07 RX ADMIN — ENOXAPARIN SODIUM 30 MG: 30 INJECTION SUBCUTANEOUS at 09:59

## 2020-11-07 RX ADMIN — ENOXAPARIN SODIUM 30 MG: 30 INJECTION SUBCUTANEOUS at 21:51

## 2020-11-07 ASSESSMENT — PAIN SCALES - GENERAL
PAINLEVEL_OUTOF10: 0

## 2020-11-07 NOTE — PROGRESS NOTES
Hospitalist Progress Note      Name:  Doris Fine /Age/Sex: 1960  (62 y.o. male)   MRN & CSN:  [id number] & [id number] Admission Date/Time: 2020  9:06 PM   Location:  [address] [place] [address] PCP: Haley Crouch MD         Hospital Day: 4    Assessment and Plan:   Doris Fine is a 62 y.o.  male  who presents with -V acute respiratory disease        #Acute respiratory failure hypoxemic  #COVID-19 pneumonia  -Patient currently on 2.5L increaed to 4L nasal cannula  -Continue remdesivir, Decadron, convalescent plasma  -will start empirically on abx  -Continue symptomatic management  -Oxygen wean off as tolerated     Other chronic medical conditions:  · Denies any other past medical history or medication use    Diet DIET GENERAL;  Dietary Nutrition Supplements: Low Calorie High Protein Supplement   DVT Prophylaxis [] Lovenox, []  Heparin, [] SCDs, [] Ambulation   GI Prophylaxis [] PPI,  [] H2 Blocker,  [] Carafate,  [] Diet/Tube Feeds   Code Status Full Code   Disposition Patient requires continued admission due to covid pneumonia         History of Present Illness:     Chief Complaint:  acute respiratory disease  Doris Fine is a 62 y.o.  male  who presents with covid pneumonia       Ten point ROS reviewed negative, unless as noted above    Objective:   No intake or output data in the 24 hours ending 20 1346   Vitals:   Vitals:    20 0945   BP: 114/70   Pulse: 87   Resp: 18   Temp: 97.5 °F (36.4 °C)   SpO2: 93%     Physical Exam:   GEN Awake male, sitting upright in bed in no apparent distress. Appears given age. EYES Pupils are equally round. No scleral erythema, discharge, or conjunctivitis. HENT Mucous membranes are moist. Oral pharynx without exudates, no evidence of thrush. NECK Supple, no apparent thyromegaly or masses. RESP Clear to auscultation, no wheezes, rales or rhonchi. CARDIO/VASC S1/S2 auscultated. Regular rate without appreciable murmurs, rubs, or gallops.  No JVD or carotid bruits. Peripheral pulses equal bilaterally and palpable. No peripheral edema. GI Abdomen is soft without significant tenderness, masses, or guarding. Bowel sounds are normoactive. Rectal exam deferred.  No costovertebral angle tenderness. HEME/LYMPH No palpable cervical lymphadenopathy and no hepatosplenomegaly. No petechiae or ecchymoses. MSK No gross joint deformities. SKIN Normal coloration, warm, dry. NEURO Cranial nerves appear grossly intact, normal speech, no lateralizing weakness. PSYCH Awake, alert, oriented x 4. Affect appropriate.     Medications:   Medications:    albuterol sulfate HFA  2 puff Inhalation 4x daily    And    ipratropium  2 puff Inhalation 4x daily    enoxaparin  30 mg Subcutaneous BID    sodium chloride flush  10 mL Intravenous 2 times per day    sodium chloride  20 mL Intravenous Once    remdesivir IVPB  100 mg Intravenous Q24H    dexamethasone  6 mg Oral Daily      Infusions:   PRN Meds: benzonatate, 100 mg, TID PRN  acetaminophen, 650 mg, Q6H PRN    Or  acetaminophen, 650 mg, Q6H PRN  sodium chloride flush, 10 mL, PRN  polyethylene glycol, 17 g, Daily PRN  promethazine, 12.5 mg, Q6H PRN    Or  ondansetron, 4 mg, Q6H PRN          Electronically signed by Isela Bhatti MD on 11/7/2020 at 1:46 PM

## 2020-11-08 LAB
ALBUMIN SERPL-MCNC: 3.3 GM/DL (ref 3.4–5)
ALBUMIN SERPL-MCNC: 3.3 GM/DL (ref 3.4–5)
ALP BLD-CCNC: 53 IU/L (ref 40–128)
ALP BLD-CCNC: 53 IU/L (ref 40–129)
ALT SERPL-CCNC: 61 U/L (ref 10–40)
ALT SERPL-CCNC: 61 U/L (ref 10–40)
ANION GAP SERPL CALCULATED.3IONS-SCNC: 10 MMOL/L (ref 4–16)
APTT: 30 SECONDS (ref 25.1–37.1)
AST SERPL-CCNC: 90 IU/L (ref 15–37)
AST SERPL-CCNC: 90 IU/L (ref 15–37)
BASOPHILS ABSOLUTE: 0 K/CU MM
BASOPHILS RELATIVE PERCENT: 0.2 % (ref 0–1)
BILIRUB SERPL-MCNC: 0.6 MG/DL (ref 0–1)
BILIRUB SERPL-MCNC: 0.6 MG/DL (ref 0–1)
BILIRUBIN DIRECT: 0.2 MG/DL (ref 0–0.3)
BILIRUBIN, INDIRECT: 0.4 MG/DL (ref 0–0.7)
BUN BLDV-MCNC: 20 MG/DL (ref 6–23)
CALCIUM SERPL-MCNC: 8.7 MG/DL (ref 8.3–10.6)
CHLORIDE BLD-SCNC: 103 MMOL/L (ref 99–110)
CO2: 27 MMOL/L (ref 21–32)
CREAT SERPL-MCNC: 0.7 MG/DL (ref 0.9–1.3)
CULTURE: NORMAL
D DIMER: 256 NG/ML(DDU)
DIFFERENTIAL TYPE: ABNORMAL
EOSINOPHILS ABSOLUTE: 0 K/CU MM
EOSINOPHILS RELATIVE PERCENT: 0 % (ref 0–3)
FIBRINOGEN LEVEL: 494 MG/DL (ref 196.9–442.1)
GFR AFRICAN AMERICAN: >60 ML/MIN/1.73M2
GFR NON-AFRICAN AMERICAN: >60 ML/MIN/1.73M2
GLUCOSE BLD-MCNC: 119 MG/DL (ref 70–99)
GRAM SMEAR: NORMAL
HCT VFR BLD CALC: 45 % (ref 42–52)
HEMOGLOBIN: 14.9 GM/DL (ref 13.5–18)
IMMATURE NEUTROPHIL %: 1.6 % (ref 0–0.43)
INR BLD: 1.06 INDEX
LYMPHOCYTES ABSOLUTE: 1.1 K/CU MM
LYMPHOCYTES RELATIVE PERCENT: 12.7 % (ref 24–44)
Lab: NORMAL
MCH RBC QN AUTO: 30.8 PG (ref 27–31)
MCHC RBC AUTO-ENTMCNC: 33.1 % (ref 32–36)
MCV RBC AUTO: 93 FL (ref 78–100)
MONOCYTES ABSOLUTE: 1 K/CU MM
MONOCYTES RELATIVE PERCENT: 11.4 % (ref 0–4)
NUCLEATED RBC %: 0 %
PDW BLD-RTO: 12.1 % (ref 11.7–14.9)
PLATELET # BLD: 282 K/CU MM (ref 140–440)
PMV BLD AUTO: 11.3 FL (ref 7.5–11.1)
POTASSIUM SERPL-SCNC: 4.8 MMOL/L (ref 3.5–5.1)
PROTHROMBIN TIME: 12.8 SECONDS (ref 11.7–14.5)
RBC # BLD: 4.84 M/CU MM (ref 4.6–6.2)
SEGMENTED NEUTROPHILS ABSOLUTE COUNT: 6.6 K/CU MM
SEGMENTED NEUTROPHILS RELATIVE PERCENT: 74.1 % (ref 36–66)
SODIUM BLD-SCNC: 140 MMOL/L (ref 135–145)
SPECIMEN: NORMAL
TOTAL IMMATURE NEUTOROPHIL: 0.14 K/CU MM
TOTAL NUCLEATED RBC: 0 K/CU MM
TOTAL PROTEIN: 5.6 GM/DL (ref 6.4–8.2)
TOTAL PROTEIN: 5.6 GM/DL (ref 6.4–8.2)
WBC # BLD: 9 K/CU MM (ref 4–10.5)

## 2020-11-08 PROCEDURE — 85379 FIBRIN DEGRADATION QUANT: CPT

## 2020-11-08 PROCEDURE — 2500000003 HC RX 250 WO HCPCS: Performed by: STUDENT IN AN ORGANIZED HEALTH CARE EDUCATION/TRAINING PROGRAM

## 2020-11-08 PROCEDURE — 6370000000 HC RX 637 (ALT 250 FOR IP): Performed by: STUDENT IN AN ORGANIZED HEALTH CARE EDUCATION/TRAINING PROGRAM

## 2020-11-08 PROCEDURE — 6370000000 HC RX 637 (ALT 250 FOR IP): Performed by: INTERNAL MEDICINE

## 2020-11-08 PROCEDURE — 85610 PROTHROMBIN TIME: CPT

## 2020-11-08 PROCEDURE — 85730 THROMBOPLASTIN TIME PARTIAL: CPT

## 2020-11-08 PROCEDURE — 80053 COMPREHEN METABOLIC PANEL: CPT

## 2020-11-08 PROCEDURE — 85025 COMPLETE CBC W/AUTO DIFF WBC: CPT

## 2020-11-08 PROCEDURE — 6360000002 HC RX W HCPCS: Performed by: INTERNAL MEDICINE

## 2020-11-08 PROCEDURE — 82248 BILIRUBIN DIRECT: CPT

## 2020-11-08 PROCEDURE — 2580000003 HC RX 258: Performed by: STUDENT IN AN ORGANIZED HEALTH CARE EDUCATION/TRAINING PROGRAM

## 2020-11-08 PROCEDURE — 2700000000 HC OXYGEN THERAPY PER DAY

## 2020-11-08 PROCEDURE — 2580000003 HC RX 258: Performed by: INTERNAL MEDICINE

## 2020-11-08 PROCEDURE — 80048 BASIC METABOLIC PNL TOTAL CA: CPT

## 2020-11-08 PROCEDURE — 6360000002 HC RX W HCPCS: Performed by: STUDENT IN AN ORGANIZED HEALTH CARE EDUCATION/TRAINING PROGRAM

## 2020-11-08 PROCEDURE — 1200000000 HC SEMI PRIVATE

## 2020-11-08 PROCEDURE — 85384 FIBRINOGEN ACTIVITY: CPT

## 2020-11-08 PROCEDURE — 94761 N-INVAS EAR/PLS OXIMETRY MLT: CPT

## 2020-11-08 RX ADMIN — ENOXAPARIN SODIUM 30 MG: 30 INJECTION SUBCUTANEOUS at 20:25

## 2020-11-08 RX ADMIN — SODIUM CHLORIDE, PRESERVATIVE FREE 10 ML: 5 INJECTION INTRAVENOUS at 20:26

## 2020-11-08 RX ADMIN — CEFTRIAXONE 1 G: 1 INJECTION, POWDER, FOR SOLUTION INTRAMUSCULAR; INTRAVENOUS at 14:39

## 2020-11-08 RX ADMIN — DEXAMETHASONE 6 MG: 2 TABLET ORAL at 10:35

## 2020-11-08 RX ADMIN — SODIUM CHLORIDE, PRESERVATIVE FREE 10 ML: 5 INJECTION INTRAVENOUS at 10:36

## 2020-11-08 RX ADMIN — REMDESIVIR 100 MG: 5 INJECTION INTRAVENOUS at 10:52

## 2020-11-08 RX ADMIN — ENOXAPARIN SODIUM 30 MG: 30 INJECTION SUBCUTANEOUS at 10:35

## 2020-11-08 RX ADMIN — AZITHROMYCIN MONOHYDRATE 250 MG: 250 TABLET ORAL at 10:35

## 2020-11-08 ASSESSMENT — PAIN SCALES - GENERAL: PAINLEVEL_OUTOF10: 0

## 2020-11-08 NOTE — PROGRESS NOTES
movement. CARDIO/VASC S1/S2 auscultated. Regular rate without appreciable murmurs, rubs, or gallops. No JVD or carotid bruits. Peripheral pulses equal bilaterally and palpable. No peripheral edema. GI Abdomen is soft without significant tenderness, masses, or guarding. Bowel sounds are normoactive. Rectal exam deferred.  No costovertebral angle tenderness. HEME/LYMPH No palpable cervical lymphadenopathy and no hepatosplenomegaly. No petechiae or ecchymoses. MSK No gross joint deformities. SKIN Normal coloration, warm, dry. NEURO Cranial nerves appear grossly intact, normal speech, no lateralizing weakness. PSYCH Awake, alert, oriented x 4. Affect appropriate.     Medications:   Medications:    albuterol sulfate HFA  2 puff Inhalation 4x daily    And    ipratropium  2 puff Inhalation 4x daily    cefTRIAXone (ROCEPHIN) IV  1 g Intravenous Q24H    azithromycin  250 mg Oral Daily    enoxaparin  30 mg Subcutaneous BID    sodium chloride flush  10 mL Intravenous 2 times per day    sodium chloride  20 mL Intravenous Once    remdesivir IVPB  100 mg Intravenous Q24H    dexamethasone  6 mg Oral Daily      Infusions:   PRN Meds: benzonatate, 100 mg, TID PRN  acetaminophen, 650 mg, Q6H PRN    Or  acetaminophen, 650 mg, Q6H PRN  sodium chloride flush, 10 mL, PRN  polyethylene glycol, 17 g, Daily PRN  promethazine, 12.5 mg, Q6H PRN    Or  ondansetron, 4 mg, Q6H PRN          Electronically signed by Sarwat Peña MD on 11/8/2020 at 2:14 PM

## 2020-11-09 ENCOUNTER — APPOINTMENT (OUTPATIENT)
Dept: CT IMAGING | Age: 60
DRG: 177 | End: 2020-11-09
Payer: COMMERCIAL

## 2020-11-09 LAB
ALBUMIN SERPL-MCNC: 3.2 GM/DL (ref 3.4–5)
ALBUMIN SERPL-MCNC: 3.2 GM/DL (ref 3.4–5)
ALP BLD-CCNC: 59 IU/L (ref 40–128)
ALP BLD-CCNC: 59 IU/L (ref 40–129)
ALT SERPL-CCNC: 82 U/L (ref 10–40)
ALT SERPL-CCNC: 82 U/L (ref 10–40)
ANION GAP SERPL CALCULATED.3IONS-SCNC: 8 MMOL/L (ref 4–16)
APTT: 32.1 SECONDS (ref 25.1–37.1)
AST SERPL-CCNC: 72 IU/L (ref 15–37)
AST SERPL-CCNC: 72 IU/L (ref 15–37)
BASOPHILS ABSOLUTE: 0 K/CU MM
BASOPHILS RELATIVE PERCENT: 0.3 % (ref 0–1)
BILIRUB SERPL-MCNC: 0.5 MG/DL (ref 0–1)
BILIRUB SERPL-MCNC: 0.5 MG/DL (ref 0–1)
BILIRUBIN DIRECT: 0.2 MG/DL (ref 0–0.3)
BILIRUBIN, INDIRECT: 0.3 MG/DL (ref 0–0.7)
BUN BLDV-MCNC: 18 MG/DL (ref 6–23)
CALCIUM SERPL-MCNC: 8.6 MG/DL (ref 8.3–10.6)
CHLORIDE BLD-SCNC: 100 MMOL/L (ref 99–110)
CO2: 27 MMOL/L (ref 21–32)
CREAT SERPL-MCNC: 0.6 MG/DL (ref 0.9–1.3)
D DIMER: 232 NG/ML(DDU)
DIFFERENTIAL TYPE: ABNORMAL
EOSINOPHILS ABSOLUTE: 0 K/CU MM
EOSINOPHILS RELATIVE PERCENT: 0 % (ref 0–3)
FIBRINOGEN LEVEL: 492 MG/DL (ref 196.9–442.1)
GFR AFRICAN AMERICAN: >60 ML/MIN/1.73M2
GFR NON-AFRICAN AMERICAN: >60 ML/MIN/1.73M2
GLUCOSE BLD-MCNC: 154 MG/DL (ref 70–99)
HCT VFR BLD CALC: 43.9 % (ref 42–52)
HEMOGLOBIN: 14.4 GM/DL (ref 13.5–18)
IMMATURE NEUTROPHIL %: 2 % (ref 0–0.43)
INR BLD: 1.09 INDEX
LYMPHOCYTES ABSOLUTE: 1 K/CU MM
LYMPHOCYTES RELATIVE PERCENT: 10.4 % (ref 24–44)
MCH RBC QN AUTO: 30.4 PG (ref 27–31)
MCHC RBC AUTO-ENTMCNC: 32.8 % (ref 32–36)
MCV RBC AUTO: 92.6 FL (ref 78–100)
MONOCYTES ABSOLUTE: 0.9 K/CU MM
MONOCYTES RELATIVE PERCENT: 9.7 % (ref 0–4)
NUCLEATED RBC %: 0 %
PDW BLD-RTO: 12 % (ref 11.7–14.9)
PLATELET # BLD: 308 K/CU MM (ref 140–440)
PMV BLD AUTO: 11.3 FL (ref 7.5–11.1)
POTASSIUM SERPL-SCNC: 4.7 MMOL/L (ref 3.5–5.1)
PROTHROMBIN TIME: 13.2 SECONDS (ref 11.7–14.5)
RBC # BLD: 4.74 M/CU MM (ref 4.6–6.2)
SEGMENTED NEUTROPHILS ABSOLUTE COUNT: 7.2 K/CU MM
SEGMENTED NEUTROPHILS RELATIVE PERCENT: 77.6 % (ref 36–66)
SODIUM BLD-SCNC: 135 MMOL/L (ref 135–145)
TOTAL IMMATURE NEUTOROPHIL: 0.18 K/CU MM
TOTAL NUCLEATED RBC: 0 K/CU MM
TOTAL PROTEIN: 5.5 GM/DL (ref 6.4–8.2)
TOTAL PROTEIN: 5.5 GM/DL (ref 6.4–8.2)
WBC # BLD: 9.2 K/CU MM (ref 4–10.5)

## 2020-11-09 PROCEDURE — 6370000000 HC RX 637 (ALT 250 FOR IP): Performed by: INTERNAL MEDICINE

## 2020-11-09 PROCEDURE — 2580000003 HC RX 258: Performed by: INTERNAL MEDICINE

## 2020-11-09 PROCEDURE — 1200000000 HC SEMI PRIVATE

## 2020-11-09 PROCEDURE — 85379 FIBRIN DEGRADATION QUANT: CPT

## 2020-11-09 PROCEDURE — 82248 BILIRUBIN DIRECT: CPT

## 2020-11-09 PROCEDURE — 85730 THROMBOPLASTIN TIME PARTIAL: CPT

## 2020-11-09 PROCEDURE — 6360000002 HC RX W HCPCS: Performed by: STUDENT IN AN ORGANIZED HEALTH CARE EDUCATION/TRAINING PROGRAM

## 2020-11-09 PROCEDURE — 80053 COMPREHEN METABOLIC PANEL: CPT

## 2020-11-09 PROCEDURE — 94640 AIRWAY INHALATION TREATMENT: CPT

## 2020-11-09 PROCEDURE — 2580000003 HC RX 258: Performed by: STUDENT IN AN ORGANIZED HEALTH CARE EDUCATION/TRAINING PROGRAM

## 2020-11-09 PROCEDURE — 85610 PROTHROMBIN TIME: CPT

## 2020-11-09 PROCEDURE — 94761 N-INVAS EAR/PLS OXIMETRY MLT: CPT

## 2020-11-09 PROCEDURE — 2700000000 HC OXYGEN THERAPY PER DAY

## 2020-11-09 PROCEDURE — 2500000003 HC RX 250 WO HCPCS: Performed by: STUDENT IN AN ORGANIZED HEALTH CARE EDUCATION/TRAINING PROGRAM

## 2020-11-09 PROCEDURE — 85025 COMPLETE CBC W/AUTO DIFF WBC: CPT

## 2020-11-09 PROCEDURE — 80048 BASIC METABOLIC PNL TOTAL CA: CPT

## 2020-11-09 PROCEDURE — 85384 FIBRINOGEN ACTIVITY: CPT

## 2020-11-09 RX ORDER — 0.9 % SODIUM CHLORIDE 0.9 %
20 INTRAVENOUS SOLUTION INTRAVENOUS ONCE
Status: DISCONTINUED | OUTPATIENT
Start: 2020-11-09 | End: 2020-11-11 | Stop reason: HOSPADM

## 2020-11-09 RX ORDER — 0.9 % SODIUM CHLORIDE 0.9 %
20 INTRAVENOUS SOLUTION INTRAVENOUS ONCE
Status: COMPLETED | OUTPATIENT
Start: 2020-11-09 | End: 2020-11-10

## 2020-11-09 RX ADMIN — ENOXAPARIN SODIUM 30 MG: 30 INJECTION SUBCUTANEOUS at 09:44

## 2020-11-09 RX ADMIN — Medication 2 PUFF: at 07:32

## 2020-11-09 RX ADMIN — SODIUM CHLORIDE 20 ML: 9 INJECTION, SOLUTION INTRAVENOUS at 19:58

## 2020-11-09 RX ADMIN — ALBUTEROL SULFATE 2 PUFF: 90 AEROSOL, METERED RESPIRATORY (INHALATION) at 11:20

## 2020-11-09 RX ADMIN — REMDESIVIR 100 MG: 5 INJECTION INTRAVENOUS at 16:06

## 2020-11-09 RX ADMIN — Medication 2 PUFF: at 11:20

## 2020-11-09 RX ADMIN — ALBUTEROL SULFATE 2 PUFF: 90 AEROSOL, METERED RESPIRATORY (INHALATION) at 07:31

## 2020-11-09 RX ADMIN — AZITHROMYCIN MONOHYDRATE 250 MG: 250 TABLET ORAL at 09:44

## 2020-11-09 RX ADMIN — ALBUTEROL SULFATE 2 PUFF: 90 AEROSOL, METERED RESPIRATORY (INHALATION) at 19:38

## 2020-11-09 RX ADMIN — DEXAMETHASONE 6 MG: 2 TABLET ORAL at 09:51

## 2020-11-09 RX ADMIN — SODIUM CHLORIDE, PRESERVATIVE FREE 10 ML: 5 INJECTION INTRAVENOUS at 09:45

## 2020-11-09 RX ADMIN — Medication 2 PUFF: at 19:38

## 2020-11-09 ASSESSMENT — PAIN SCALES - GENERAL
PAINLEVEL_OUTOF10: 0

## 2020-11-09 NOTE — PROGRESS NOTES
Hospitalist Progress Note      Name:  Cyn Luna /Age/Sex: 1960  (62 y.o. male)   MRN & CSN:  [id number] & [id number] Admission Date/Time: 2020  9:06 PM   Location:  [address] [place] [address]- PCP: Juan Miguel Felipe MD         Hospital Day: 6    Assessment and Plan:   Cyn Luna is a 62 y.o.  male  who presents with -V acute respiratory disease       #Acute respiratory failure hypoxemic  #COVID-19 pneumonia  -Patient uses 6 L of oxygen today,  -Covid may be reason for hypoxia, however will do a CT angiogram to ensure no other factors.  -Continue remdesivir, Decadron  -s/p convalescent plasma  -continue ceftriaxone and azithromycin  -Continue symptomatic management  -Oxygen wean off as tolerated, can DC when below 6 L of oxygen.       Other chronic medical conditions:  · Denies any other past medical history or medication use    Diet DIET GENERAL;  Dietary Nutrition Supplements: Low Calorie High Protein Supplement   DVT Prophylaxis [] Lovenox, []  Heparin, [] SCDs, [] Ambulation   GI Prophylaxis [] PPI,  [] H2 Blocker,  [] Carafate,  [] Diet/Tube Feeds   Code Status Full Code   Disposition Patient requires continued admission due to arf         History of Present Illness:     Chief Complaint:  acute respiratory disease  Cyn Luna is a 62 y.o.  male  who presents with ARF       Ten point ROS reviewed negative, unless as noted above    Objective: Intake/Output Summary (Last 24 hours) at 2020 1357  Last data filed at 2020 0440  Gross per 24 hour   Intake 1740 ml   Output 800 ml   Net 940 ml      Vitals:   Vitals:    20 1121   BP:    Pulse:    Resp:    Temp:    SpO2: 92%     Physical Exam:   GEN Awake male, sitting upright in bed in no apparent distress. Appears given age. EYES Pupils are equally round. No scleral erythema, discharge, or conjunctivitis. HENT Mucous membranes are moist. Oral pharynx without exudates, no evidence of thrush.   NECK Supple, no apparent thyromegaly or masses. RESP Clear to auscultation, no wheezes, rales or rhonchi. Symmetric chest movement. CARDIO/VASC S1/S2 auscultated. Regular rate without appreciable murmurs, rubs, or gallops. No JVD or carotid bruits. Peripheral pulses equal bilaterally and palpable. No peripheral edema. GI Abdomen is soft without significant tenderness, masses, or guarding. Bowel sounds are normoactive. Rectal exam deferred.  No costovertebral angle tenderness. HEME/LYMPH No palpable cervical lymphadenopathy and no hepatosplenomegaly. No petechiae or ecchymoses. MSK No gross joint deformities. SKIN Normal coloration, warm, dry. NEURO Cranial nerves appear grossly intact, normal speech, no lateralizing weakness. PSYCH Awake, alert, oriented x 4. Affect appropriate.     Medications:   Medications:    albuterol sulfate HFA  2 puff Inhalation 4x daily    And    ipratropium  2 puff Inhalation 4x daily    azithromycin  250 mg Oral Daily    enoxaparin  30 mg Subcutaneous BID    sodium chloride flush  10 mL Intravenous 2 times per day    sodium chloride  20 mL Intravenous Once    remdesivir IVPB  100 mg Intravenous Q24H    dexamethasone  6 mg Oral Daily      Infusions:   PRN Meds: benzonatate, 100 mg, TID PRN  acetaminophen, 650 mg, Q6H PRN    Or  acetaminophen, 650 mg, Q6H PRN  sodium chloride flush, 10 mL, PRN  polyethylene glycol, 17 g, Daily PRN  promethazine, 12.5 mg, Q6H PRN    Or  ondansetron, 4 mg, Q6H PRN          Electronically signed by Kiel Foley MD on 11/9/2020 at 1:57 PM

## 2020-11-09 NOTE — CARE COORDINATION
Reviewed chart and spoke with pt, he still plans on returning home with wife. May need home O2 but denies any other needs at this time.

## 2020-11-10 ENCOUNTER — APPOINTMENT (OUTPATIENT)
Dept: CT IMAGING | Age: 60
DRG: 177 | End: 2020-11-10
Payer: COMMERCIAL

## 2020-11-10 LAB
ALBUMIN SERPL-MCNC: 3.1 GM/DL (ref 3.4–5)
ALP BLD-CCNC: 55 IU/L (ref 40–129)
ALT SERPL-CCNC: 67 U/L (ref 10–40)
ANION GAP SERPL CALCULATED.3IONS-SCNC: 13 MMOL/L (ref 4–16)
APTT: 28.1 SECONDS (ref 25.1–37.1)
AST SERPL-CCNC: 40 IU/L (ref 15–37)
BASOPHILS ABSOLUTE: 0 K/CU MM
BASOPHILS RELATIVE PERCENT: 0.4 % (ref 0–1)
BILIRUB SERPL-MCNC: 0.5 MG/DL (ref 0–1)
BUN BLDV-MCNC: 20 MG/DL (ref 6–23)
CALCIUM SERPL-MCNC: 8.6 MG/DL (ref 8.3–10.6)
CHLORIDE BLD-SCNC: 97 MMOL/L (ref 99–110)
CO2: 22 MMOL/L (ref 21–32)
COMPONENT: NORMAL
COMPONENT: NORMAL
CREAT SERPL-MCNC: 0.7 MG/DL (ref 0.9–1.3)
D DIMER: 258 NG/ML(DDU)
DIFFERENTIAL TYPE: ABNORMAL
EOSINOPHILS ABSOLUTE: 0 K/CU MM
EOSINOPHILS RELATIVE PERCENT: 0 % (ref 0–3)
FIBRINOGEN LEVEL: 579 MG/DL (ref 196.9–442.1)
GFR AFRICAN AMERICAN: >60 ML/MIN/1.73M2
GFR NON-AFRICAN AMERICAN: >60 ML/MIN/1.73M2
GLUCOSE BLD-MCNC: 184 MG/DL (ref 70–99)
HCT VFR BLD CALC: 43.5 % (ref 42–52)
HEMOGLOBIN: 14.1 GM/DL (ref 13.5–18)
IMMATURE NEUTROPHIL %: 4.9 % (ref 0–0.43)
INR BLD: 1 INDEX
LYMPHOCYTES ABSOLUTE: 0.9 K/CU MM
LYMPHOCYTES RELATIVE PERCENT: 8.9 % (ref 24–44)
Lab: 1
MCH RBC QN AUTO: 30.5 PG (ref 27–31)
MCHC RBC AUTO-ENTMCNC: 32.4 % (ref 32–36)
MCV RBC AUTO: 94 FL (ref 78–100)
MONOCYTES ABSOLUTE: 0.7 K/CU MM
MONOCYTES RELATIVE PERCENT: 6.7 % (ref 0–4)
NUCLEATED RBC %: 0 %
PDW BLD-RTO: 12.2 % (ref 11.7–14.9)
PLATELET # BLD: 343 K/CU MM (ref 140–440)
PMV BLD AUTO: 11.1 FL (ref 7.5–11.1)
POTASSIUM SERPL-SCNC: 5 MMOL/L (ref 3.5–5.1)
PROCALCITONIN: 0.06
PROTHROMBIN TIME: 12.1 SECONDS (ref 11.7–14.5)
RBC # BLD: 4.63 M/CU MM (ref 4.6–6.2)
SARS-COV-2, NAAT: DETECTED
SEGMENTED NEUTROPHILS ABSOLUTE COUNT: 7.6 K/CU MM
SEGMENTED NEUTROPHILS RELATIVE PERCENT: 79.1 % (ref 36–66)
SODIUM BLD-SCNC: 132 MMOL/L (ref 135–145)
SOURCE: ABNORMAL
STATUS: NORMAL
TOTAL IMMATURE NEUTOROPHIL: 0.47 K/CU MM
TOTAL NUCLEATED RBC: 0 K/CU MM
TOTAL PROTEIN: 5.6 GM/DL (ref 6.4–8.2)
TRANSFUSION STATUS: NORMAL
UNIT DIVISION: NORMAL
UNIT NUMBER: NORMAL
WBC # BLD: 9.7 K/CU MM (ref 4–10.5)

## 2020-11-10 PROCEDURE — 85384 FIBRINOGEN ACTIVITY: CPT

## 2020-11-10 PROCEDURE — 2580000003 HC RX 258: Performed by: HOSPITALIST

## 2020-11-10 PROCEDURE — 85025 COMPLETE CBC W/AUTO DIFF WBC: CPT

## 2020-11-10 PROCEDURE — 1200000000 HC SEMI PRIVATE

## 2020-11-10 PROCEDURE — 71275 CT ANGIOGRAPHY CHEST: CPT

## 2020-11-10 PROCEDURE — 85379 FIBRIN DEGRADATION QUANT: CPT

## 2020-11-10 PROCEDURE — 80053 COMPREHEN METABOLIC PANEL: CPT

## 2020-11-10 PROCEDURE — 2700000000 HC OXYGEN THERAPY PER DAY

## 2020-11-10 PROCEDURE — 6370000000 HC RX 637 (ALT 250 FOR IP): Performed by: STUDENT IN AN ORGANIZED HEALTH CARE EDUCATION/TRAINING PROGRAM

## 2020-11-10 PROCEDURE — 6370000000 HC RX 637 (ALT 250 FOR IP): Performed by: INTERNAL MEDICINE

## 2020-11-10 PROCEDURE — U0002 COVID-19 LAB TEST NON-CDC: HCPCS

## 2020-11-10 PROCEDURE — 85610 PROTHROMBIN TIME: CPT

## 2020-11-10 PROCEDURE — 84145 PROCALCITONIN (PCT): CPT

## 2020-11-10 PROCEDURE — 94761 N-INVAS EAR/PLS OXIMETRY MLT: CPT

## 2020-11-10 PROCEDURE — 0202U NFCT DS 22 TRGT SARS-COV-2: CPT

## 2020-11-10 PROCEDURE — 6360000002 HC RX W HCPCS: Performed by: STUDENT IN AN ORGANIZED HEALTH CARE EDUCATION/TRAINING PROGRAM

## 2020-11-10 PROCEDURE — 2580000003 HC RX 258: Performed by: STUDENT IN AN ORGANIZED HEALTH CARE EDUCATION/TRAINING PROGRAM

## 2020-11-10 PROCEDURE — 6360000004 HC RX CONTRAST MEDICATION: Performed by: HOSPITALIST

## 2020-11-10 PROCEDURE — 85730 THROMBOPLASTIN TIME PARTIAL: CPT

## 2020-11-10 PROCEDURE — 94640 AIRWAY INHALATION TREATMENT: CPT

## 2020-11-10 RX ORDER — SODIUM CHLORIDE 0.9 % (FLUSH) 0.9 %
10 SYRINGE (ML) INJECTION 2 TIMES DAILY
Status: DISCONTINUED | OUTPATIENT
Start: 2020-11-10 | End: 2020-11-11 | Stop reason: HOSPADM

## 2020-11-10 RX ADMIN — Medication 2 PUFF: at 08:13

## 2020-11-10 RX ADMIN — ALBUTEROL SULFATE 2 PUFF: 90 AEROSOL, METERED RESPIRATORY (INHALATION) at 20:05

## 2020-11-10 RX ADMIN — SODIUM CHLORIDE, PRESERVATIVE FREE 10 ML: 5 INJECTION INTRAVENOUS at 09:13

## 2020-11-10 RX ADMIN — Medication 2 PUFF: at 11:38

## 2020-11-10 RX ADMIN — Medication 2 PUFF: at 16:13

## 2020-11-10 RX ADMIN — SODIUM CHLORIDE, PRESERVATIVE FREE 10 ML: 5 INJECTION INTRAVENOUS at 19:58

## 2020-11-10 RX ADMIN — IOPAMIDOL 75 ML: 755 INJECTION, SOLUTION INTRAVENOUS at 20:49

## 2020-11-10 RX ADMIN — DEXAMETHASONE 6 MG: 2 TABLET ORAL at 09:13

## 2020-11-10 RX ADMIN — AZITHROMYCIN MONOHYDRATE 250 MG: 250 TABLET ORAL at 09:13

## 2020-11-10 RX ADMIN — ENOXAPARIN SODIUM 30 MG: 30 INJECTION SUBCUTANEOUS at 09:13

## 2020-11-10 RX ADMIN — ALBUTEROL SULFATE 2 PUFF: 90 AEROSOL, METERED RESPIRATORY (INHALATION) at 16:12

## 2020-11-10 RX ADMIN — ENOXAPARIN SODIUM 30 MG: 30 INJECTION SUBCUTANEOUS at 19:58

## 2020-11-10 RX ADMIN — Medication 2 PUFF: at 20:06

## 2020-11-10 RX ADMIN — ALBUTEROL SULFATE 2 PUFF: 90 AEROSOL, METERED RESPIRATORY (INHALATION) at 11:39

## 2020-11-10 RX ADMIN — ALBUTEROL SULFATE 2 PUFF: 90 AEROSOL, METERED RESPIRATORY (INHALATION) at 08:12

## 2020-11-10 ASSESSMENT — PAIN SCALES - GENERAL
PAINLEVEL_OUTOF10: 0
PAINLEVEL_OUTOF10: 0

## 2020-11-10 NOTE — PROGRESS NOTES
movement of all extremities. No gross joint deformities. SKIN Normal coloration, warm, dry. NEURO Cranial nerves appear grossly intact, normal speech, no lateralizing weakness. PSYCH Awake, alert, oriented x 4. Affect appropriate. INTAKE: In: 802.5 [P.O.:600; Blood:202.5]  Out: -   OUTPUT: In: 802.5   Out: -     LABS  Recent Labs     11/08/20 0800 11/09/20  0400 11/10/20  0500   WBC 9.0 9.2 9.7   HGB 14.9 14.4 14.1   HCT 45.0 43.9 43.5    308 343      Recent Labs     11/08/20 0800 11/09/20  0400 11/10/20  0500    135 132*   K 4.8 4.7 5.0    100 97*   CO2 27 27 22   BUN 20 18 20   CREATININE 0.7* 0.6* 0.7*     Recent Labs     11/08/20 0800 11/09/20  0400 11/10/20  0500   AST 90*  90* 72*  72* 40*   ALT 61*  61* 82*  82* 67*   BILIDIR 0.2 0.2  --    BILITOT 0.6  0.6 0.5  0.5 0.5   ALKPHOS 53  53 59  59 55     Recent Labs     11/08/20 0800 11/09/20 0400 11/10/20  0500   INR 1.06 1.09 1.00     No results for input(s): CKTOTAL, CKMB, CKMBINDEX, TROPONINT in the last 72 hours. Abnormal labs for today noted      Imaging:     ECHO:    Microbiology:  Blood culture:    Urine culture:    Sputum culture:    Procedures done this admission:    MEDS  Scheduled Meds:   sodium chloride  20 mL Intravenous Once    albuterol sulfate HFA  2 puff Inhalation 4x daily    And    ipratropium  2 puff Inhalation 4x daily    azithromycin  250 mg Oral Daily    enoxaparin  30 mg Subcutaneous BID    sodium chloride flush  10 mL Intravenous 2 times per day    sodium chloride  20 mL Intravenous Once    dexamethasone  6 mg Oral Daily     Continuous Infusions:  PRN Meds:benzonatate, acetaminophen **OR** acetaminophen, sodium chloride flush, polyethylene glycol, promethazine **OR** ondansetron        ASSESSMENT and [address] [facility] Day: 7    1-Acute hypoxic respiratory failure due to COVID pneumonia- on 5 liters [place] oxygen when seen.    -reported tested positive as an outpatient- will repeat here again  -CTA pending- decadron started on 11/5, remdesevir on 11/6. S/p convalescent plasma.   - discussed proning today for 16 hours daily-     Check other inflammatory markers including procalcitonin- if rising will add antibiotics    2-Elevated LFTs- mild- monitor for now          :     Disp:     Diet DIET GENERAL;  Dietary Nutrition Supplements: Low Calorie High Protein Supplement   DVT Prophylaxis [] Lovenox, []  Heparin, [] SCDs, [] Ambulation   GI Prophylaxis [] PPI,  [] H2 Blocker,  [] Carafate,  [] Diet/Tube Feeds   Code Status Full Code   Disposition Patient requires continued admission due to acute hypoxic respiratory failure   CMS Level of Risk [] Low, [] Moderate,[x]  High  Patient's risk as above due to acute hypoxic respiratory failure     MISAEL HU MD 11/10/2020 8:44 AM

## 2020-11-11 VITALS
WEIGHT: 225 LBS | HEART RATE: 91 BPM | OXYGEN SATURATION: 93 % | BODY MASS INDEX: 29.82 KG/M2 | SYSTOLIC BLOOD PRESSURE: 105 MMHG | RESPIRATION RATE: 16 BRPM | DIASTOLIC BLOOD PRESSURE: 66 MMHG | HEIGHT: 73 IN | TEMPERATURE: 97.9 F

## 2020-11-11 LAB
ALBUMIN SERPL-MCNC: 3.3 GM/DL (ref 3.4–5)
ALP BLD-CCNC: 52 IU/L (ref 40–129)
ALT SERPL-CCNC: 53 U/L (ref 10–40)
ANION GAP SERPL CALCULATED.3IONS-SCNC: 9 MMOL/L (ref 4–16)
ANISOCYTOSIS: ABNORMAL
APTT: 28.7 SECONDS (ref 25.1–37.1)
AST SERPL-CCNC: 26 IU/L (ref 15–37)
BANDED NEUTROPHILS ABSOLUTE COUNT: 0.34 K/CU MM
BANDED NEUTROPHILS RELATIVE PERCENT: 3 % (ref 5–11)
BILIRUB SERPL-MCNC: 0.6 MG/DL (ref 0–1)
BUN BLDV-MCNC: 19 MG/DL (ref 6–23)
CALCIUM SERPL-MCNC: 8.8 MG/DL (ref 8.3–10.6)
CHLORIDE BLD-SCNC: 98 MMOL/L (ref 99–110)
CO2: 28 MMOL/L (ref 21–32)
CREAT SERPL-MCNC: 0.7 MG/DL (ref 0.9–1.3)
D DIMER: 249 NG/ML(DDU)
DIFFERENTIAL TYPE: ABNORMAL
FIBRINOGEN LEVEL: 480 MG/DL (ref 196.9–442.1)
GFR AFRICAN AMERICAN: >60 ML/MIN/1.73M2
GFR NON-AFRICAN AMERICAN: >60 ML/MIN/1.73M2
GLUCOSE BLD-MCNC: 130 MG/DL (ref 70–99)
HCT VFR BLD CALC: 45 % (ref 42–52)
HEMOGLOBIN: 14.5 GM/DL (ref 13.5–18)
INR BLD: 1.03 INDEX
LYMPHOCYTES ABSOLUTE: 1.3 K/CU MM
LYMPHOCYTES RELATIVE PERCENT: 12 % (ref 24–44)
MCH RBC QN AUTO: 30 PG (ref 27–31)
MCHC RBC AUTO-ENTMCNC: 32.2 % (ref 32–36)
MCV RBC AUTO: 93 FL (ref 78–100)
MONOCYTES ABSOLUTE: 0.7 K/CU MM
MONOCYTES RELATIVE PERCENT: 6 % (ref 0–4)
OTHER CELL MORPHOLOGY: ABNORMAL
PDW BLD-RTO: 12.1 % (ref 11.7–14.9)
PLATELET # BLD: 414 K/CU MM (ref 140–440)
PLT MORPHOLOGY: ABNORMAL
PMV BLD AUTO: 10.8 FL (ref 7.5–11.1)
POTASSIUM SERPL-SCNC: 5.3 MMOL/L (ref 3.5–5.1)
PROCALCITONIN: 0.03
PROTHROMBIN TIME: 12.5 SECONDS (ref 11.7–14.5)
RBC # BLD: 4.84 M/CU MM (ref 4.6–6.2)
SEGMENTED NEUTROPHILS ABSOLUTE COUNT: 8.7 K/CU MM
SEGMENTED NEUTROPHILS RELATIVE PERCENT: 78 % (ref 36–66)
SODIUM BLD-SCNC: 135 MMOL/L (ref 135–145)
TOTAL PROTEIN: 5.7 GM/DL (ref 6.4–8.2)
UNDIFFERENTIATED CELL: 1 %
WBC # BLD: 11.2 K/CU MM (ref 4–10.5)

## 2020-11-11 PROCEDURE — 80053 COMPREHEN METABOLIC PANEL: CPT

## 2020-11-11 PROCEDURE — 85007 BL SMEAR W/DIFF WBC COUNT: CPT

## 2020-11-11 PROCEDURE — 94761 N-INVAS EAR/PLS OXIMETRY MLT: CPT

## 2020-11-11 PROCEDURE — 85730 THROMBOPLASTIN TIME PARTIAL: CPT

## 2020-11-11 PROCEDURE — 2580000003 HC RX 258: Performed by: STUDENT IN AN ORGANIZED HEALTH CARE EDUCATION/TRAINING PROGRAM

## 2020-11-11 PROCEDURE — 2580000003 HC RX 258: Performed by: HOSPITALIST

## 2020-11-11 PROCEDURE — 85027 COMPLETE CBC AUTOMATED: CPT

## 2020-11-11 PROCEDURE — 84145 PROCALCITONIN (PCT): CPT

## 2020-11-11 PROCEDURE — 85379 FIBRIN DEGRADATION QUANT: CPT

## 2020-11-11 PROCEDURE — 94640 AIRWAY INHALATION TREATMENT: CPT

## 2020-11-11 PROCEDURE — 85610 PROTHROMBIN TIME: CPT

## 2020-11-11 PROCEDURE — 6370000000 HC RX 637 (ALT 250 FOR IP): Performed by: HOSPITALIST

## 2020-11-11 PROCEDURE — 6370000000 HC RX 637 (ALT 250 FOR IP): Performed by: INTERNAL MEDICINE

## 2020-11-11 PROCEDURE — 6360000002 HC RX W HCPCS: Performed by: STUDENT IN AN ORGANIZED HEALTH CARE EDUCATION/TRAINING PROGRAM

## 2020-11-11 PROCEDURE — 2700000000 HC OXYGEN THERAPY PER DAY

## 2020-11-11 PROCEDURE — 6370000000 HC RX 637 (ALT 250 FOR IP): Performed by: STUDENT IN AN ORGANIZED HEALTH CARE EDUCATION/TRAINING PROGRAM

## 2020-11-11 PROCEDURE — 85384 FIBRINOGEN ACTIVITY: CPT

## 2020-11-11 PROCEDURE — 94618 PULMONARY STRESS TESTING: CPT

## 2020-11-11 RX ORDER — AMOXICILLIN AND CLAVULANATE POTASSIUM 875; 125 MG/1; MG/1
1 TABLET, FILM COATED ORAL EVERY 12 HOURS SCHEDULED
Qty: 14 TABLET | Refills: 0 | Status: SHIPPED | OUTPATIENT
Start: 2020-11-11 | End: 2020-11-18

## 2020-11-11 RX ORDER — SODIUM POLYSTYRENE SULFONATE 15 G/60ML
15 SUSPENSION ORAL; RECTAL ONCE
Status: COMPLETED | OUTPATIENT
Start: 2020-11-11 | End: 2020-11-11

## 2020-11-11 RX ORDER — ALBUTEROL SULFATE 90 UG/1
2 AEROSOL, METERED RESPIRATORY (INHALATION) 4 TIMES DAILY
Qty: 1 INHALER | Refills: 3 | Status: SHIPPED | OUTPATIENT
Start: 2020-11-11 | End: 2021-02-05 | Stop reason: ALTCHOICE

## 2020-11-11 RX ORDER — DEXAMETHASONE 6 MG/1
6 TABLET ORAL DAILY
Qty: 3 TABLET | Refills: 0 | Status: SHIPPED | OUTPATIENT
Start: 2020-11-12 | End: 2020-11-15

## 2020-11-11 RX ORDER — AMOXICILLIN AND CLAVULANATE POTASSIUM 875; 125 MG/1; MG/1
1 TABLET, FILM COATED ORAL EVERY 12 HOURS SCHEDULED
Status: DISCONTINUED | OUTPATIENT
Start: 2020-11-11 | End: 2020-11-11 | Stop reason: HOSPADM

## 2020-11-11 RX ORDER — BENZONATATE 100 MG/1
100 CAPSULE ORAL 3 TIMES DAILY PRN
Qty: 20 CAPSULE | Refills: 0 | Status: SHIPPED | OUTPATIENT
Start: 2020-11-11 | End: 2020-11-18

## 2020-11-11 RX ADMIN — SODIUM CHLORIDE, PRESERVATIVE FREE 10 ML: 5 INJECTION INTRAVENOUS at 09:06

## 2020-11-11 RX ADMIN — SODIUM POLYSTYRENE SULFONATE 15 G: 15 SUSPENSION ORAL; RECTAL at 11:44

## 2020-11-11 RX ADMIN — Medication 2 PUFF: at 11:45

## 2020-11-11 RX ADMIN — ALBUTEROL SULFATE 2 PUFF: 90 AEROSOL, METERED RESPIRATORY (INHALATION) at 15:12

## 2020-11-11 RX ADMIN — ALBUTEROL SULFATE 2 PUFF: 90 AEROSOL, METERED RESPIRATORY (INHALATION) at 11:45

## 2020-11-11 RX ADMIN — SODIUM CHLORIDE, PRESERVATIVE FREE 10 ML: 5 INJECTION INTRAVENOUS at 09:05

## 2020-11-11 RX ADMIN — DEXAMETHASONE 6 MG: 2 TABLET ORAL at 09:07

## 2020-11-11 RX ADMIN — AZITHROMYCIN MONOHYDRATE 250 MG: 250 TABLET ORAL at 09:05

## 2020-11-11 RX ADMIN — ALBUTEROL SULFATE 2 PUFF: 90 AEROSOL, METERED RESPIRATORY (INHALATION) at 07:48

## 2020-11-11 RX ADMIN — Medication 2 PUFF: at 15:12

## 2020-11-11 RX ADMIN — ENOXAPARIN SODIUM 30 MG: 30 INJECTION SUBCUTANEOUS at 09:05

## 2020-11-11 RX ADMIN — Medication 2 PUFF: at 07:47

## 2020-11-11 RX ADMIN — AMOXICILLIN AND CLAVULANATE POTASSIUM 1 TABLET: 875; 125 TABLET, FILM COATED ORAL at 11:44

## 2020-11-11 ASSESSMENT — PAIN SCALES - GENERAL
PAINLEVEL_OUTOF10: 0
PAINLEVEL_OUTOF10: 0

## 2020-11-11 NOTE — DISCHARGE SUMMARY
Discharge Summary    Name:  Cyn Luna /Age/Sex: 1960  (62 y.o. male)   MRN & CSN:  [id number] & [id number] Admission Date/Time: 2020  9:06 PM   Attending:  Swati Gonzalez MD Discharging Physician: Laura Rich MD     HPI and Hospital Course:   Cyn Luna is a 62 y.o.  male  who presents with Nausea (Pt stated he tested postive on ) and Fatigue    HPI- as per H and [place]  1-Acute hypoxic respiratory failure due to COVID pneumonia- treated with decadron, remdesevir and plasma- has improved. Oxygen requirement down to 2 liters today- will do home oxygen evaluation and discharge home  -CTA noted with bilateral opacities- will add course of augmentin at discharge as well, also due to potential reduced mobility- discussed DVT prophylaxis- will add low dose for one month  - stressed on proning as possible- last night did it for 9 hours and felt better- discussed to continue same with goal of 16 hours daily as possible. Will have oximeter at home and can monitor his oxygen saturations daily  2-Elevated LFTs- mild- monitor for now  3-Hyperkalemia- mild, ordered dose of kayexelate today           The patient expressed appropriate understanding of and agreement with the discharge recommendations, medications, and plan.      Consults this admission:  IP CONSULT TO HOSPITALIST  IP CONSULT TO PHARMACY    Discharge Instruction:   Follow up appointments:   Primary care physician:  within 2 weeks    Diet:  General/cardiac/ADA/as tolerated  Activity: {discharge activity: as tolerated  Disposition: Discharged to:   [x]Home, []C, []SNF, []Acute Rehab, []Hospice   Condition on discharge: Stable    Discharge Medications:      Jemal Quiquecipriano   Home Medication Instructions BRIAN:[id number]    Printed on:20 1024   Medication Information                      albuterol sulfate  (90 Base) MCG/ACT inhaler  Inhale 2 puffs into the lungs 4 times daily amoxicillin-clavulanate (AUGMENTIN) 875-125 MG per tablet  Take 1 tablet by mouth every 12 hours for 7 days             apixaban (ELIQUIS) 2.5 MG TABS tablet  Take 1 tablet by mouth 2 times daily             benzonatate (TESSALON) 100 MG capsule  Take 1 capsule by mouth 3 times daily as needed for Cough             dexamethasone (DECADRON) 6 MG tablet  Take 1 tablet by mouth daily for 3 days             Multiple Vitamins-Minerals (THERAPEUTIC MULTIVITAMIN-MINERALS) tablet  Take 1 tablet by mouth daily. valACYclovir (VALTREX) 500 MG tablet  Take 1 tablet by mouth daily                 Objective Findings at Discharge:   /66   Pulse 91   Temp 97.9 °F (36.6 °C) (Oral)   Resp 16   Ht 6' 1\" (1.854 m)   Wt 225 lb (102.1 kg)   SpO2 93%   BMI 29.69 kg/m²            PHYSICAL EXAM   GEN Awake male, laying in bed in no apparent distress. EYES Pupils are equally round. No scleral discharge  HENT Atraumatic and symmetric head  NECK No apparent thyromegaly  RESP Symmetric chest movement while on room air. CARDIO/VASC Peripheral pulses equal bilaterally and palpable. GI Abdomen is not distended. Rectal exam deferred.  Whalen catheter is not present. HEME/LYMPH No petechiae or ecchymoses. MSK Spontaneous movement of BL upper extremities  SKIN Normal coloration, warm, dry. NEURO Cranial nerves appear grossly intact  PSYCH Awake, alert.     BMP/CBC  Recent Labs     11/09/20  0400 11/10/20  0500 11/11/20  0525    132* 135   K 4.7 5.0 5.3*    97* 98*   CO2 27 22 28   BUN 18 20 19   CREATININE 0.6* 0.7* 0.7*   WBC 9.2 9.7 11.2*   HCT 43.9 43.5 45.0    343 414     SIGNIFICANT IMAGING AND LABS:      Discharge Time of 32  minutes    Electronically signed by Cyn Barreto MD on 11/11/2020 at 10:24 AM

## 2020-11-11 NOTE — PROGRESS NOTES
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 male)   MRN & CSN:  [id number] & [id number] Admission Date/Time: 2020  9:06 PM   Location:  [facility]3909-S Attending:  Osiel Tolliver MD                                                  [facility]  Lieutenant Gomes is a 62 y.o. male who was admitted on  with acute respiratory failure    SUBJECTIVE  -doing better, to be discharged today    10 point review of systems reviewed and negative unless noted above. ALLERGIES: No Known Allergies    PCP: Lou Anderson MD    PAST MEDICAL HISTORY, SURGICAL HISTORY, SOCIAL HISTORY and  HOME MEDICATIONS all reviewed. OBJECTIVE  Vitals:    11/10/20 2005 20 0321 20 0750 20 0900   BP:  (!) 96/54  105/66   Pulse:  65  91   Resp: 18 16 16 16   Temp:  97.5 °F (36.4 °C)  97.9 °F (36.6 °C)   TempSrc:    Oral   SpO2: 94% 98% 96% 93%   Weight:       Height:           PHYSICAL EXAM   GEN Awake male, sitting upright in bed in no apparent distress. EYES Pupils are equally round. No scleral erythema, discharge, or conjunctivitis. HENT Mucous membranes are moist. Oral pharynx without exudates, no evidence of thrush. NECK Supple, no apparent thyromegaly or masses. RESP unlabored respiration, bilateral coarse breath sounds  CARDIO/VASC S1/S2 auscultated. Regular rate without appreciable murmurs, rubs, or gallops. No JVD or carotid bruits. Peripheral pulses equal bilaterally and palpable. No peripheral edema. GI Abdomen is soft without significant tenderness, masses, or guarding. Bowel sounds are normoactive. Rectal exam deferred.  No costovertebral angle tenderness. Normal appearing external genitalia. HEME/LYMPH No palpable cervical lymphadenopathy and no hepatosplenomegaly. No petechiae or ecchymoses. MSK Spontaneous movement of all extremities. No gross joint deformities. SKIN Normal coloration, warm, dry.   NEURO Cranial nerves appear grossly intact, normal speech, no lateralizing weakness. PSYCH Awake, alert, oriented x 4. Affect appropriate. INTAKE: In: 240 [P.O.:240]  Out: -   OUTPUT: In: 240   Out: -     LABS  Recent Labs     11/09/20  0400 11/10/20  0500 11/11/20  0525   WBC 9.2 9.7 11.2*   HGB 14.4 14.1 14.5   HCT 43.9 43.5 45.0    343 414      Recent Labs     11/09/20  0400 11/10/20  0500 11/11/20  0525    132* 135   K 4.7 5.0 5.3*    97* 98*   CO2 27 22 28   BUN 18 20 19   CREATININE 0.6* 0.7* 0.7*     Recent Labs     11/09/20 0400 11/10/20  0500 11/11/20  0525   AST 72*  72* 40* 26   ALT 82*  82* 67* 53*   BILIDIR 0.2  --   --    BILITOT 0.5  0.5 0.5 0.6   ALKPHOS 59  59 55 52     Recent Labs     11/09/20 0400 11/10/20  0500 11/11/20  0525   INR 1.09 1.00 1.03     No results for input(s): CKTOTAL, CKMB, CKMBINDEX, TROPONINT in the last 72 hours. Abnormal labs for today noted      Imaging:     ECHO:    Microbiology:  Blood culture:    Urine culture:    Sputum culture:    Procedures done this admission:    MEDS  Scheduled Meds:   amoxicillin-clavulanate  1 tablet Oral 2 times per day    sodium chloride flush  10 mL Intravenous BID    sodium chloride  20 mL Intravenous Once    albuterol sulfate HFA  2 puff Inhalation 4x daily    And    ipratropium  2 puff Inhalation 4x daily    enoxaparin  30 mg Subcutaneous BID    sodium chloride flush  10 mL Intravenous 2 times per day    sodium chloride  20 mL Intravenous Once    dexamethasone  6 mg Oral Daily     Continuous Infusions:  PRN Meds:benzonatate, acetaminophen **OR** acetaminophen, sodium chloride flush, polyethylene glycol, promethazine **OR** ondansetron        ASSESSMENT and [facility] Day: 8    1-Acute hypoxic respiratory failure due to COVID pneumonia- on 2 liters [place] oxygen when seen. -reported tested positive as an outpatient- home oxygen evaluation  -CTA pending- decadron started on 11/5, remdesevir on 11/6. S/p convalescent plasma.   - discussed proning today for 16 hours daily-     Check other inflammatory markers including procalcitonin- if rising will add antibiotics    2-Elevated LFTs- mild- monitor for now      Patient was seen in hospital for Pneumonia due to Covid 19 virus. I am prescribing oxygen because the diagnosis and testing requires the patient to have oxygen in the home. Conditions will improve or be benefited by oxygen use. The patient is able to perform good mobility and therefore requires the use of a portable oxygen system for ambulation.       :     Disp:     Diet DIET GENERAL;  Dietary Nutrition Supplements: Low Calorie High Protein Supplement   DVT Prophylaxis [] Lovenox, []  Heparin, [] SCDs, [] Ambulation   GI Prophylaxis [] PPI,  [] H2 Blocker,  [] Carafate,  [] Diet/Tube Feeds   Code Status Full Code   Disposition Patient requires continued admission due to acute hypoxic respiratory failure   CMS Level of Risk [] Low, [] Moderate,[x]  High  Patient's risk as above due to acute hypoxic respiratory failure     MISAEL HU MD 11/11/2020 2:38 PM

## 2020-11-11 NOTE — PROGRESS NOTES
Pt instructed on use of concentrator with assistance of Aicha with respiratory. Pt is ready for discharge.

## 2020-11-11 NOTE — PROGRESS NOTES
11/11/2020 10:06 AM  Patient Room #: 0166/1713-S  Patient Name: Ana Nur    (Step 1 Done by RN if possible otherwise call Pulmonary Diagnostics)  1. Place patient on room air at rest for at least 30 minutes. If patient falls below 88% before 30 minutes then you can record the level and stop. Record room air saturation level _93_ %. If patient is at 88% or below, they will qualify for home oxygen and you can stop. If level does not fall below 88%, fill in level above. If indicated continue to Step 2. Signature:__Griffin Palm RRT___ Date: _11/11/2020__  (Step 2&3 Done by P)  2. Ambulate patient on room air until saturation falls below 89%. Record level of room air saturation with ambulation_87__ %. Next, place patient back on _2__lpm oxygen and ambulate, record level 92_%. (Note:  this level must show improvement from room air level done with ambulation.)  If patients saturation on room air with ambulation is 88% or below AND patient shows improvement with oxygen during ambulation, they will qualify for home oxygen and you can stop. If patient does not drop below 89%, then patient should have an overnight oximetry trending on room air to see if level falls below 88%. Complete level in Step 3 below. 3. Room air overnight oximetry level 88 % for___  cumulative minutes. If patients room air oxygen level is < 89% for at least 5 cumulative minutes, patient will qualify for home oxygen and you can stop. (Attach Night Trending Report)    Complete order below: Diagnosis:_Pneumonia due to Covid 19 ___  Home oxygen at:  Length of Need: ? Lifetime ? X 3 Months     __2_lpm or __%   via  [x] nasal cannula  []mask  [] other:___         []continuous [x]  with activity  [x]  Nocturnal   [x] Portable Tanks []  Concentrator        Therapist Signature:__Griffin Palm RRT___     Date:  _11/11/2020__  Physician Signature:  __Electronically Signed in EMR_    Date:___  Physician Printed Name: Erlinda Cortés MD NPI:  [id number]    [x] Patient Qualifies      [] Patient Does NOT qualify

## 2020-11-11 NOTE — PROGRESS NOTES
Gave oxygen concentrator to Deny Rodriguez and explained how to use it. She will make sure the pt calls Holly Callahan when he gets home. She will tube receipt to [facility]piratory [facility].

## 2020-11-11 NOTE — PROGRESS NOTES
Doctor Please copy and paste below in your progress note per DME requirements. Doctor Please copy and paste below in your progress note per DME requirements. Patient was seen in hospital for Pneumonia due to Covid 19 virus. I am prescribing oxygen because the diagnosis and testing requires the patient to have oxygen in the home. Conditions will improve or be benefited by oxygen use. The patient is able to perform good mobility and therefore requires the use of a portable oxygen system for ambulation.

## 2020-11-12 ENCOUNTER — CARE COORDINATION (OUTPATIENT)
Dept: CASE MANAGEMENT | Age: 60
End: 2020-11-12

## 2020-11-12 ENCOUNTER — TELEPHONE (OUTPATIENT)
Dept: FAMILY MEDICINE CLINIC | Age: 60
End: 2020-11-12

## 2020-11-12 NOTE — TELEPHONE ENCOUNTER
[facility] Transitions Initial Follow Up Call    Call within 2 business days of discharge: Yes     Patient: Aide Marrow Patient : 1960 MRN: [id number]    [unfilled]    RARS: Readmission Risk Score: 10       Spoke with: pt    Discharge department/facility: [facility]    Non-face-to-face services provided:  Scheduled appointment with PCP-Radha Nolan PA-C     Meds reviewed.     Follow Up  Future Appointments   Date Time Provider Molly Lawrence   2020  2:00 PM Oanh Bey [facility]   2021  2:30 PM Greg Lowery MD [facility]   2021  2:30 PM SCHEDULE, [facility] MED ONC LAB [facility] MED ONC [place]   2021  2:30 PM Radha Hill MD [facility] MED ONC [facility]   10/18/2021  8:00 AM Greg Lowery MD [facility]       Dasha Juan MA

## 2020-11-12 NOTE — CARE COORDINATION
[facility] Transitions Initial Follow Up Call    Call within 2 business days of discharge: Yes    Patient: Segun Jimenez Patient : 1960   MRN: [id number]  Reason for Admission: Covid19 + Pna, Ac Resp Failure  Discharge Date: 20 RARS: Readmission Risk Score: [address] [facility] [address]: [address]       Complaint Diagnosis Description Type Department Provider    20 Nausea; Fatigue Pneumonia due to COVID-19 virus ED to Hosp-Admission (Discharged) (ADMITTED) Rylie Graham MD; Sina Butler, ... Non-face-to-face services provided:  Obtained and reviewed discharge summary and/or continuity of care documents    Care Transitions 24 Hour Call    Schedule Follow Up Appointment with PCP:  Declined  Do you have any ongoing symptoms?:  Yes  Patient-reported symptoms:  Shortness of Breath  Interventions for patient-reported symptoms:  Other (Comment: Patient denied need)  Do you have a copy of your discharge instructions?:  Yes  Do you have all of your prescriptions and are they filled?:  Yes  Have you been contacted by a [facility] Pharmacist?:  No  Have you scheduled your follow up appointment?:  Yes  How are you going to get to your appointment?:  Other  Were you discharged with any Home Care or Post Acute Services:  No  Do you feel like you have everything you need to keep you well at home?:  Yes  Care Transitions Interventions  No Identified Needs   Home Care Waiver:  Declined        DME Assistance:  Declined        Follow Up  Future Appointments   Date Time Provider Molly Lawrence   2020  2:00 PM Mi Amos [address] [place] [address]   2021  2:30 PM Dominick Malhotra MD [address] [place] [address]   2021  2:30 PM SCHEDULE, [address] ONC LAB [facility] MED ONC [place]   2021  2:30 PM Armaan Bosch MD [address] [place] [address] MED ONC [facility]   10/18/2021  8:00 AM Dominick Malhotra MD [address] [place] [address]   .   [address] SCREEN: 11/10/2020 Positive  PATIENT RISK FACTORS: Testicular Cancer    Spoke w/ Patient for initial [facility] Monitoring discharge call. Patient reports doing \"pretty good, not as beat up as I was\". Reports some sob and O2 sat drop to high 80's with O2 off for shower etc.  Sats 92-94% w/ O2 on as directed. Denies resp distress, fever, n/v, h/a, malaise. Denies need for MD notification. Reports up moving around and sleeping in prone position as advised by hospital staff. Reports O2 concentrator and tank delivered, in good working order, denies questions or needs. Received copy of AVS, reviewed. Obtained and taking Augmentin, Decadron, Eliquis, Albuterol as directed. Has Tessalon but has not needed to take. Rx education provided, verbalizes understanding. Stressed importance of not skipping doses and completing entire courses unless otherwise directed by MD. Advised 90 day supply routine/prn meds. CTN reviewed discharge instructions, medical action plan and Covid 19 red flags such as increased shortness of breath, increasing fever and signs of decompensation with patient who verbalized understanding. Discussed exposure protocols and quarantine with CDC Guidelines What to do if you are sick with coronavirus disease 2019.  Reports adhering to guidelines. Sister dropping off needed supplies, food. Spoke w/ [facility] while inpt, left message once home and awaiting call back. Advised to contact [facility]/PCP with any Covid19 questions. Confirmed 11/16/2020 2p appt w/ D Carmen PAC. Advised to contact PCP 24/7 re: any health concerns. Denies resource needs including hhc, dme, community assistance. Patient/family/caregiver given information for [employer] and agrees to enroll. Patient's preferred e-mail: [email or web address]  Patient's preferred phone number: [phone]   Based on Loop alert triggers, patient will be contacted by nurse care manager for worsening symptoms.     Rudy Massey RN

## 2020-11-16 ENCOUNTER — VIRTUAL VISIT (OUTPATIENT)
Dept: FAMILY MEDICINE CLINIC | Age: 60
End: 2020-11-16
Payer: COMMERCIAL

## 2020-11-16 PROCEDURE — 99442 PR PHYS/QHP TELEPHONE EVALUATION 11-20 MIN: CPT | Performed by: PHYSICIAN ASSISTANT

## 2020-11-16 PROCEDURE — 1111F DSCHRG MED/CURRENT MED MERGE: CPT | Performed by: PHYSICIAN ASSISTANT

## 2020-11-16 NOTE — PROGRESS NOTES
Post-Discharge Transitional Care Management Services or Hospital Follow Up      Melina Gillespie   YOB: 1960    Date of Office Visit:  11/16/2020  Date of Hospital Admission: 11/4/20  Date of Hospital Discharge: 11/11/20  Risk of hospital readmission (high >=14%. Medium >=10%) :Readmission Risk Score: 10      Care management risk score Rising risk (score 2-5) and Complex Care (Scores >=6): 0     Non face to face  following discharge, date last encounter closed (first attempt may have been earlier): 11/12/2020  2:28 PM    Call initiated 2 business days of discharge: Yes    Patient Active Problem List   Diagnosis    BPH (benign prostatic hyperplasia)    Herpes simplex of male genitalia    Splenomegaly, not elsewhere classified    Other lichen planus    1476-[facility] acute respiratory disease    Moderate malnutrition ([address]       No Known Allergies    Medications listed as ordered at the time of discharge from hospital   Chet Reyes   Home Medication Instructions BRIAN:    Printed on:11/16/20 1341   Medication Information                      albuterol sulfate  (90 Base) MCG/ACT inhaler  Inhale 2 puffs into the lungs 4 times daily             amoxicillin-clavulanate (AUGMENTIN) 875-125 MG per tablet  Take 1 tablet by mouth every 12 hours for 7 days             apixaban (ELIQUIS) 2.5 MG TABS tablet  Take 1 tablet by mouth 2 times daily             benzonatate (TESSALON) 100 MG capsule  Take 1 capsule by mouth 3 times daily as needed for Cough             Multiple Vitamins-Minerals (THERAPEUTIC MULTIVITAMIN-MINERALS) tablet  Take 1 tablet by mouth daily. valACYclovir (VALTREX) 500 MG tablet  Take 1 tablet by mouth daily                   Medications marked \"taking\" at this time  No outpatient medications have been marked as taking for the 11/16/20 encounter (Virtual Visit) with Mone Santana PA-C.         Medications patient taking as of now reconciled against medications ordered at time of hospital discharge: Yes    No chief complaint on file. History of Present illness - Follow up of Hospital diagnosis(es): as below  Visit Diagnoses       Codes    COVID-19 virus infection     U07.1    Pneumonia due to COVID-19 virus     U07.1, J12.89    Hyperkalemia     E87.5    Elevated LFTs     R79.89    Bacterial pneumonia     J15.9        Inpatient course: Discharge summary reviewed- see chart. Interval history/Current status:   -Got taste and smell back  -No fever since discharged from hospital  -Still has a little bit of SOB but mostly feels winded  -Is doing breathing treatments every 4 hours  -O2 levels have been 91-95% even without oxygen when walking around. -only coughing every now and again    A comprehensive review of systems was negative except for what was noted in the HPI. There were no vitals filed for this visit. There is no height or weight on file to calculate BMI. Wt Readings from Last 3 Encounters:   11/10/20 225 lb (102.1 kg)   10/15/20 244 lb 6.4 oz (110.9 kg)   09/24/20 245 lb 3.2 oz (111.2 kg)     BP Readings from Last 3 Encounters:   11/11/20 105/66   10/15/20 124/80   09/24/20 126/80        Physical Exam:  Not done due to this being a telephone encounter    Assessment/Plan:  1. COVID-19 virus infection  2. Pneumonia due to COVID-19 virus  3. Hyperkalemia  4. Elevated LFTs  5. Bacterial pneumonia  Patient only has a couple of days left of the antibiotic for the bacterial pneumonia. Will come in next week to reevaluate his liver enzymes and his potassium level. Overall is feeling much improved, still little bit winded on exertion so not quite able to go back to work just yet. We will see how well he can recovers this week. He will continue to monitor his oxygen levels. He will stay on the Eliquis low-dose twice daily for 30 days and then may discontinue.   Answered patient's questions and concerns.  - TN DISCHARGE MEDS RECONCILED W/ CURRENT OUTPATIENT MED LIST  - COMPREHENSIVE METABOLIC PANEL; Future        Medical Decision Making: sam Grant is a 62 y.o. male evaluated via telephone for his TCM on 11/16/2020. Consent:  He and/or health care decision maker is aware that that he may receive a bill for this telephone service, depending on his insurance coverage, and has provided verbal consent to proceed: Yes    I affirm this is a Patient Initiated Episode with an Established Patient who has not had a related appointment within my department in the past 7 days or scheduled within the next 24 hours.     Total Time: minutes: 11-20 minutes    Note: not billable if this call serves to triage the patient into an appointment for the relevant concern      Presley Murguia

## 2020-11-24 DIAGNOSIS — E87.5 HYPERKALEMIA: ICD-10-CM

## 2020-11-24 DIAGNOSIS — R79.89 ELEVATED LFTS: ICD-10-CM

## 2020-11-24 LAB
A/G RATIO: 1.6 (ref 1.1–2.2)
ALBUMIN SERPL-MCNC: 3.9 G/DL (ref 3.4–5)
ALP BLD-CCNC: 68 U/L (ref 40–129)
ALT SERPL-CCNC: 20 U/L (ref 10–40)
ANION GAP SERPL CALCULATED.3IONS-SCNC: 10 MMOL/L (ref 3–16)
AST SERPL-CCNC: 21 U/L (ref 15–37)
BILIRUB SERPL-MCNC: 1 MG/DL (ref 0–1)
BUN BLDV-MCNC: 13 MG/DL (ref 7–20)
CALCIUM SERPL-MCNC: 9.3 MG/DL (ref 8.3–10.6)
CHLORIDE BLD-SCNC: 107 MMOL/L (ref 99–110)
CO2: 27 MMOL/L (ref 21–32)
CREAT SERPL-MCNC: 0.9 MG/DL (ref 0.9–1.3)
GFR AFRICAN AMERICAN: >60
GFR NON-AFRICAN AMERICAN: >60
GLOBULIN: 2.4 G/DL
GLUCOSE BLD-MCNC: 100 MG/DL (ref 70–99)
POTASSIUM SERPL-SCNC: 5.1 MMOL/L (ref 3.5–5.1)
SODIUM BLD-SCNC: 144 MMOL/L (ref 136–145)
TOTAL PROTEIN: 6.3 G/DL (ref 6.4–8.2)

## 2020-12-01 ENCOUNTER — TELEPHONE (OUTPATIENT)
Dept: FAMILY MEDICINE CLINIC | Age: 60
End: 2020-12-01

## 2020-12-01 NOTE — TELEPHONE ENCOUNTER
Can he get a note to go back to work on Monday and how long does he need to use the 2 rescue inhalers? ?    Please call with any questions Patient

## 2021-02-05 ENCOUNTER — OFFICE VISIT (OUTPATIENT)
Dept: FAMILY MEDICINE CLINIC | Age: 61
End: 2021-02-05
Payer: COMMERCIAL

## 2021-02-05 VITALS
BODY MASS INDEX: 32.34 KG/M2 | RESPIRATION RATE: 16 BRPM | SYSTOLIC BLOOD PRESSURE: 122 MMHG | WEIGHT: 244 LBS | TEMPERATURE: 97.2 F | DIASTOLIC BLOOD PRESSURE: 64 MMHG | OXYGEN SATURATION: 98 % | HEIGHT: 73 IN | HEART RATE: 87 BPM

## 2021-02-05 DIAGNOSIS — K40.90 INGUINAL HERNIA OF RIGHT SIDE WITHOUT OBSTRUCTION OR GANGRENE: Primary | ICD-10-CM

## 2021-02-05 PROCEDURE — 99213 OFFICE O/P EST LOW 20 MIN: CPT | Performed by: STUDENT IN AN ORGANIZED HEALTH CARE EDUCATION/TRAINING PROGRAM

## 2021-02-05 NOTE — PROGRESS NOTES
2/8/2021    Awilda Mendoza    Chief Complaint   Patient presents with    Groin Pain     Right groin pain. History of left inguinal hernia repair. No known injury. Did do some heavy lifting  recently. Started about three weeks ago. It was better then started again. HPI  History was obtained from patient. Vince Hendrickson is a 62 y.o. male with a PMHx as listed below significant for Hx. Left Testicular cancer, Hx. Of left inguinal hernia repair, HSV who presents today for right groin discomfort. Patient has noticed this sensation over the last 4-5 weeks. Denies any trauma or event causing pain. Denies feeling any lump. Denies sensation related to exertion, exercise, or coughing. Notes pain  similar to his prior left inguinal hernia repair when he was in high school. No change in bowel or bladder habits. No blood in urine. On valtrex, last breakout in genital area three weeks ago. 1. Inguinal hernia of right side without obstruction or gangrene             REVIEW OF SYMPTOMS    Review of Systems   Constitutional: Negative for chills and fatigue. HENT: Negative for congestion and sore throat. Respiratory: Negative for shortness of breath and wheezing. Cardiovascular: Negative for chest pain and palpitations. Gastrointestinal: Negative for abdominal pain and nausea. Genitourinary: Negative for difficulty urinating, discharge, dysuria, frequency, genital sores, hematuria, penile pain, penile swelling, scrotal swelling, testicular pain and urgency. +inguinal discomfort   Neurological: Negative for light-headedness.        PAST MEDICAL HISTORY  Past Medical History:   Diagnosis Date    BPH (benign prostatic hyperplasia)     Cancer [facility])     Testicular    Herpes simplex of male genitalia     Urinary frequency        FAMILY HISTORY  Family History   Problem Relation Age of Onset    Heart Disease Mother     Heart Disease Father     Cancer Father         prostate       SOCIAL HISTORY  Social History     Socioeconomic History    Marital status:      Spouse name: None    Number of children: None    Years of education: None    Highest education level: None   Occupational History    None   Social Needs    Financial resource strain: None    Food insecurity     Worry: None     Inability: None    Transportation needs     Medical: None     Non-medical: None   Tobacco Use    Smoking status: Never Smoker    Smokeless tobacco: Never Used   Substance and Sexual Activity    Alcohol use: Yes     Alcohol/week: 1.0 standard drinks     Types: 1 Cans of beer per week    Drug use: No    Sexual activity: Never   Lifestyle    Physical activity     Days per week: None     Minutes per session: None    Stress: None   Relationships    Social connections     Talks on phone: None     Gets together: None     Attends Caodaism service: None     Active member of club or organization: None     Attends meetings of clubs or organizations: None     Relationship status: None    Intimate partner violence     Fear of current or ex partner: None     Emotionally abused: None     Physically abused: None     Forced sexual activity: None   Other Topics Concern    None   Social History Narrative    None        SURGICAL HISTORY  Past Surgical History:   Procedure Laterality Date    COLONOSCOPY  11-22-13    poly's    HERNIA REPAIR Left 1978    inguinal    TESTICLE REMOVAL Left     TONSILLECTOMY                   CURRENT MEDICATIONS  Current Outpatient Medications   Medication Sig Dispense Refill    valACYclovir (VALTREX) 500 MG tablet Take 1 tablet by mouth daily 90 tablet 3    Multiple Vitamins-Minerals (THERAPEUTIC MULTIVITAMIN-MINERALS) tablet Take 1 tablet by mouth daily. No current facility-administered medications for this visit.         ALLERGIES  No Known Allergies    PHYSICAL EXAM    /64   Pulse 87   Temp 97.2 °F (36.2 °C)   Resp 16   Ht 6' 1\" (1.854 m)   Wt 244 lb (110.7 kg)   SpO2 98% BMI 32.19 kg/m²     Physical Exam  Constitutional:       Appearance: Normal appearance. HENT:      Head: Normocephalic and atraumatic. Eyes:      Extraocular Movements: Extraocular movements intact. Pupils: Pupils are equal, round, and reactive to light. Cardiovascular:      Rate and Rhythm: Normal rate and regular rhythm. Pulses: Normal pulses. Heart sounds: No murmur. No friction rub. No gallop. Abdominal:      Hernia: There is no hernia in the left inguinal area or right inguinal area. Genitourinary:     Pubic Area: No rash. Penis: Normal and circumcised. No erythema, tenderness, discharge, swelling or lesions. Testes: Cremasteric reflex is present. Right: Mass, tenderness or swelling not present. Left: Mass, tenderness or swelling not present. Skin:     General: Skin is warm and dry. Neurological:      General: No focal deficit present. Mental Status: He is alert. Psychiatric:         Mood and Affect: Mood normal.         Behavior: Behavior normal.         ASSESSMENT & PLAN    1. Inguinal hernia of right side without obstruction or gangrene  -Unable to palpate, will get imaging to help r/o hernia or mass.   -Hx. Of Left inguinal hernia repair, Left testicle removal  - US SCROTUM AND TESTICLES; Future      Return for as scheduled.          Electronically signed by Makenna Bach DO on 2/8/2021

## 2021-02-08 ASSESSMENT — ENCOUNTER SYMPTOMS
WHEEZING: 0
NAUSEA: 0
ABDOMINAL PAIN: 0
SHORTNESS OF BREATH: 0
SORE THROAT: 0

## 2021-04-16 ENCOUNTER — HOSPITAL ENCOUNTER (OUTPATIENT)
Dept: ULTRASOUND IMAGING | Age: 61
Discharge: HOME | End: 2021-04-16
Payer: COMMERCIAL

## 2021-04-16 DIAGNOSIS — K40.90 INGUINAL HERNIA OF RIGHT SIDE WITHOUT OBSTRUCTION OR GANGRENE: ICD-10-CM

## 2021-04-16 PROCEDURE — 93975 VASCULAR STUDY: CPT

## 2021-04-16 PROCEDURE — 76870 US EXAM SCROTUM: CPT

## 2021-04-19 DIAGNOSIS — K40.90 RIGHT INGUINAL HERNIA: Primary | ICD-10-CM

## 2021-04-20 ENCOUNTER — OFFICE VISIT (OUTPATIENT)
Dept: FAMILY MEDICINE CLINIC | Age: 61
End: 2021-04-20
Payer: COMMERCIAL

## 2021-04-20 VITALS
DIASTOLIC BLOOD PRESSURE: 88 MMHG | HEART RATE: 90 BPM | WEIGHT: 244.2 LBS | SYSTOLIC BLOOD PRESSURE: 120 MMHG | OXYGEN SATURATION: 97 % | BODY MASS INDEX: 32.37 KG/M2 | TEMPERATURE: 97.3 F | HEIGHT: 73 IN

## 2021-04-20 DIAGNOSIS — K40.90 INGUINAL HERNIA OF RIGHT SIDE WITHOUT OBSTRUCTION OR GANGRENE: ICD-10-CM

## 2021-04-20 DIAGNOSIS — J06.9 2019-NCOV ACUTE RESPIRATORY DISEASE: Primary | ICD-10-CM

## 2021-04-20 DIAGNOSIS — A60.02 HERPES SIMPLEX OF MALE GENITALIA: ICD-10-CM

## 2021-04-20 DIAGNOSIS — U07.1 2019-NCOV ACUTE RESPIRATORY DISEASE: Primary | ICD-10-CM

## 2021-04-20 PROBLEM — E44.0 MODERATE MALNUTRITION (HCC): Chronic | Status: RESOLVED | Noted: 2020-11-05 | Resolved: 2021-04-20

## 2021-04-20 PROCEDURE — 90471 IMMUNIZATION ADMIN: CPT | Performed by: FAMILY MEDICINE

## 2021-04-20 PROCEDURE — 90715 TDAP VACCINE 7 YRS/> IM: CPT | Performed by: FAMILY MEDICINE

## 2021-04-20 PROCEDURE — 99213 OFFICE O/P EST LOW 20 MIN: CPT | Performed by: FAMILY MEDICINE

## 2021-04-20 ASSESSMENT — PATIENT HEALTH QUESTIONNAIRE - PHQ9
SUM OF ALL RESPONSES TO PHQ QUESTIONS 1-9: 0
SUM OF ALL RESPONSES TO PHQ9 QUESTIONS 1 & 2: 0
2. FEELING DOWN, DEPRESSED OR HOPELESS: 0
1. LITTLE INTEREST OR PLEASURE IN DOING THINGS: 0
SUM OF ALL RESPONSES TO PHQ QUESTIONS 1-9: 0

## 2021-04-20 NOTE — PROGRESS NOTES
2021     Alba Antonio      Chief Complaint   Patient presents with    6 Month Follow-Up    Discuss Labs     US       HPI      Kylah Carrasco is a 62 y.o. male who presents today with the followin. 2019-nCoV acute respiratory disease    2. Herpes simplex of male genitalia    3. Inguinal hernia of right side without obstruction or gangrene    He is here for routine checkup  The patient had a fairly serious Covid infection within the past 6 months he was hospitalized I think for 5 days he did require temporary oxygen but he seems to have recovered completely  He is not yet had the Covid vaccine but is agreeable to take it  REVIEW OF SYMPTOMS    Review of Systems   Musculoskeletal:        Recently evaluated for inguinal hernia and this was demonstrated on ultrasound  Has been referred to a general surgeon   Skin:        He has a history of a herpes simplex type II  He is on suppressive therapy with Valtrex and responding well       PAST MEDICAL HISTORY  Past Medical History:   Diagnosis Date    BPH (benign prostatic hyperplasia)     Cancer ([facility] [address]     Testicular    Herpes simplex of male genitalia     Urinary frequency        FAMILY HISTORY  Family History   Problem Relation Age of Onset    Heart Disease Mother     Heart Disease Father     Cancer Father         prostate       SOCIAL HISTORY  Social History     Socioeconomic History    Marital status:      Spouse name: Not on file    Number of children: Not on file    Years of education: Not on file    Highest education level: Not on file   Occupational History    Not on file   Social Needs    Financial resource strain: Not on file    Food insecurity     Worry: Not on file     Inability: Not on file    Transportation needs     Medical: Not on file     Non-medical: Not on file   Tobacco Use    Smoking status: Never Smoker    Smokeless tobacco: Never Used   Substance and Sexual Activity    Alcohol use:  Yes     Alcohol/week: 1.0 standard drinks Types: 1 Cans of beer per week    Drug use: No    Sexual activity: Never   Lifestyle    Physical activity     Days per week: Not on file     Minutes per session: Not on file    Stress: Not on file   Relationships    Social connections     Talks on phone: Not on file     Gets together: Not on file     Attends Jehovah's witness service: Not on file     Active member of club or organization: Not on file     Attends meetings of clubs or organizations: Not on file     Relationship status: Not on file    Intimate partner violence     Fear of current or ex partner: Not on file     Emotionally abused: Not on file     Physically abused: Not on file     Forced sexual activity: Not on file   Other Topics Concern    Not on file   Social History Narrative    Not on file        SURGICAL HISTORY  Past Surgical History:   Procedure Laterality Date    COLONOSCOPY  11-22-13    poly's    HERNIA REPAIR Left 1978    inguinal    TESTICLE REMOVAL Left     TONSILLECTOMY         CURRENT MEDICATIONS  Current Outpatient Medications   Medication Sig Dispense Refill    valACYclovir (VALTREX) 500 MG tablet Take 1 tablet by mouth daily 90 tablet 3    Multiple Vitamins-Minerals (THERAPEUTIC MULTIVITAMIN-MINERALS) tablet Take 1 tablet by mouth daily. No current facility-administered medications for this visit. ALLERGIES  No Known Allergies    PHYSICAL EXAM    /88 (Site: Right Upper Arm, Position: Sitting, Cuff Size: Medium Adult)   Pulse 90   Temp 97.3 °F (36.3 °C)   Ht 6' 1\" (1.854 m)   Wt 244 lb 3.2 oz (110.8 kg)   SpO2 97%   BMI 32.22 kg/m²     Physical Exam  Vitals signs and nursing note reviewed. Cardiovascular:      Rate and Rhythm: Normal rate. Pulmonary:      Effort: Pulmonary effort is normal.   Neurological:      General: No focal deficit present.      Reviewed medication  Reviewed immunizations  Reviewed ultrasound        ASSESSMENT and PLAN    Tessa Chen was seen today for 6 month follow-up and discuss labs.    Diagnoses and all orders for this visit:    2019-nCoV acute respiratory disease    Herpes simplex of male genitalia    Inguinal hernia of right side without obstruction or gangrene    Other orders  -     Tdap (age 10y-63y) IM (Adacel)    Keep appointment with a surgeon for the hernia  Continue same medications  T back Tdap vaccination today and recommend Covid vaccine  Follow-up in 6 months    Return in about 6 months (around 10/20/2021). Electronically signed by Jasmyne Olmos MD on 4/20/2021    Please note that this chart was generated using dragon dictation software. Although every effort was made to ensure the accuracy of this automated transcription, some errors in transcription may have occurred.

## 2021-04-20 NOTE — LETTER
[address] [place], [place] [address]   Phone: [phone]  Fax: [phone]    Miladys Cunningham MD        April 20, 2021     Patient: Desmond Zurita   YOB: 1960   Date of Visit: 4/20/2021       To Whom It May Concern:    Please excuse Sulma Luu from work, he was seen in our office on 4/20/2021. If you have any questions or concerns, please don't hesitate to call.     Sincerely,        Miladys Cunningham MD

## 2021-04-22 ENCOUNTER — TELEPHONE (OUTPATIENT)
Dept: FAMILY MEDICINE CLINIC | Age: 61
End: 2021-04-22

## 2021-05-24 PROBLEM — R10.32 LEFT GROIN PAIN: Status: ACTIVE | Noted: 2021-05-24

## 2021-08-23 DIAGNOSIS — N40.0 BENIGN PROSTATIC HYPERPLASIA WITHOUT LOWER URINARY TRACT SYMPTOMS: ICD-10-CM

## 2021-08-23 DIAGNOSIS — R79.89 ELEVATED LFTS: Primary | ICD-10-CM

## 2021-08-23 DIAGNOSIS — Z13.220 LIPID SCREENING: ICD-10-CM

## 2021-08-24 NOTE — PROGRESS NOTES
Patient Name: Emelina Grant  Patient : 1960  Patient MRN: [id number]     Primary Oncologist: Shakira Escalera MD  Referring Provider: Zahra Mckenzie MD     Date of Service: 2021      Chief Complaint:   Chief Complaint   Patient presents with    Follow-up     He came in for follow-up visit. Patient Active Problem List:     BPH (benign prostatic hyperplasia)     Herpes simplex of male genitalia     Splenomegaly, not elsewhere classified     Other lichen planus     HPI:   Jordana Phelps is a pleasant 81-year-old  male patient who was initially referred in 2011 for evaluation of splenomegaly. He had a remote history of EBV. I reviewed his CAT scan at the time with the radiologist who felt that he had an elongated spleen and the volume of the spleen was rather normal.     Serum protein electrophoresis study in 2011 was negative and CBCD was normal, with no signs of hypersplenism. Followup ultrasound of the spleen in 2012 showed no significant change with mild splenomegaly, measuring about 15 cm. Blood test on April 10, 2013 showed white cell count of 8.6, hemoglobin of 14.6, platelet 044, MCV 95. CMP was benign, except for slightly elevated AST at 40. This slightly elevated liver enzyme could be related to the medications versus alcohol. He admitted that he drinks beer occasionally. Blood test in 2013 showed white cell count at 6.5, hemoglobin 15.4, platelet 780. CMP was within normal limits, except for glucose of 107. PSA was 1.29. Abdominal ultrasound in 2013 showed stable mild to moderate splenomegaly at 14.5 cm. There is no sign of splenic sequestration. Colonoscopy in 2013 by Dr. Jesus Velazco showed a single, less than 5 mm polyp, found in  rectum, which was removed. The pathology report was consistent with a hyperplastic polyp.   He had green light laser surgery for the BPH on 2013, by Dr. Elham Orellana and since then he stopped taking the Flomax. Blood test on April 9, 2014 showed normal CBC and CMP. He lost about 40 pounds over one year period of time. Blood test in October 2014 showed white cell count of 6.9, hemoglobin 15.7, platelet 134. CMP was within normal limits, except for total bilirubin 1.3. Blood test in April 2015 showed normal CBC and CMP. A followup ultrasound of the liver and spleen showed mild hepatosplenomegaly with the liver measuring 19.4 and spleen 13.8 cm. The liver finding was compatible with fatty change. Blood test in October 2015 showed no evidence of splenic sequestration, despite his reported splenomegaly. White cell count was 7.2, hemoglobin 15, platelet 134. CMP was benign, with total bilirubin 1.1. Blood test in April 2016 revealed normal CBC and CMP. There is no sign other splenic sequestration. His ultrasound of the liver and spleen revealed stable splenomegaly, 13.5 cm, with gallstones. He was diagnosed with lichen planus and was treated with a local steroid by the dermatologist, Dr. Nazia De Leon. He also received steroid injections. Blood test on April 12, 2017, revealed white cell 7.3, hemoglobin 14.8, hematocrit 48.2. MCV was slightly elevated at 102.1, platelet 339. He is agreeable to have his CBC checked in one month with folic acid level, O60 level, and TSH. CMP was benign, except for the total bilirubin 1.2. I doubt that he has hemolytic anemia. Ultrasound of the abdomen in April 2017 revealed cholelithiasis without gallbladder wall thickening and slight decrease in size of the spleen measuring 13.5 by 4.7 by 5.9. Liver was normal in echogenicity. Ultrasound of abdomen in April 20 18th revealed borderline splenomegaly 13.8 cm, cholelithiasis and fatty liver. Labs in April 2019 were reviewed and stable. Ultrasound in September 2020 showed mild splenomegaly 12.8 cm in length with cholelithiasis and likely hepatic fatty infiltration.   We discussed about healthy diet. He denies any symptoms to suggest cholecystitis. CBC was grossly unremarkable. CMP was stable. Reportedly he had cystoscopy in 2019 and was found to have recurrent BPH. He will follow-up with Dr. Barbara Alcala for colonoscopy. On September 23, 2021 he came in for follow-up visit. He had Covid pneumonia in November 2020. He was hospitalized and received plasma and remdesivir. He was seen by Dr. Bea Waller for right inguinal hernia. He will be watched closely. Ultrasound of abdomen and scrotum on April 16, 2021 showed  Status post left testicle removal.   Right inguinal hernia    CT abdomen and pelvis on May 6, 2021 showed      CBC and CMP in September 2021 were grossly unremarkable. No acute pain. Denies any nausea, vomiting or diarrhea. No fever or chills. No chest pain, shortness of breath or palpitation. No headaches or dizzy spell. No specific bone pain. No melena or hematochezia. Denied any dysuria or hematuria. 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 He has a history of hernia surgery, BPH diagnosed in 2006, and herpetic simplex viral illness diagnosed in 1990. FAMILY HISTORY:  Father had prostate cancer, paternal grandfather had prostate cancer, maternal grandfather had lung cancer, and maternal grandmother had questionable ovarian cancer. SOCIAL HISTORY:  He denied any history of smoking. He drinks beer occasionally. Review of Systems: \"Per interval history; otherwise 10 point ROS is negative. \"     Vital Signs:  /71 (Site: Right Upper Arm, Position: Sitting, Cuff Size: Large Adult)   Pulse 92   Temp 98.5 °F (36.9 °C) (Temporal)   Resp 18   Ht 6' 1\" (1.854 m)   Wt 247 lb (112 kg)   SpO2 97%   BMI 32.59 kg/m²     Physical Exam:  CONSTITUTIONAL: awake, alert, cooperative, no apparent distress   EYES: pupils equal, round and reactive to light, sclera clear and conjunctiva normal  ENT: Normocephalic, without obvious abnormality, atraumatic  NECK: supple, symmetrical, no jugular venous distension and no carotid bruits   HEMATOLOGIC/LYMPHATIC: no cervical, supraclavicular or axillary lymphadenopathy   LUNGS: no increased work of breathing and clear to auscultation   CARDIOVASCULAR: regular rate and rhythm, normal S1 and S2, no murmur   ABDOMEN: normal bowel sound, soft, non-distended, non-tender, no masses palpated, no hepatosplenomegaly   MUSCULOSKELETAL: full range of motion noted, tone is normal  NEUROLOGIC: awake, alert, oriented to name, place and time. Motor skills grossly intact. Cranial nerves II through XII are grossly intact. SKIN: No jaundice. appears intact   EXTREMITIES: no LE edema. No cyanosis.      Labs:  Hematology:  Lab Results   Component Value Date    WBC 5.9 09/15/2021    RBC 5.14 09/15/2021    HGB 15.7 09/15/2021    HCT 48.1 09/15/2021    MCV 93.6 09/15/2021    MCH 30.6 09/15/2021    MCHC 32.7 09/15/2021    RDW 14.3 09/15/2021     09/15/2021    MPV 9.0 09/15/2021    BANDSPCT 3 (L) 11/11/2020    SEGSPCT 78.0 (H) 11/11/2020    EOSRELPCT 0.8 09/15/2021    BASOPCT 0.7 09/15/2021    LYMPHOPCT 23.3 09/15/2021    MONOPCT 9.4 09/15/2021    BANDABS 0.34 11/11/2020    SEGSABS 8.7 11/11/2020    EOSABS 0.0 09/15/2021    BASOSABS 0.0 09/15/2021    LYMPHSABS 1.4 09/15/2021    MONOSABS 0.6 09/15/2021    DIFFTYPE MANUAL DIFFERENTIAL 11/11/2020    ANISOCYTOSIS 1+ 11/11/2020    WBCMORP OCCASIONAL 04/12/2018    PLTM OCCASIONAL 11/11/2020     Chemistry:  Lab Results   Component Value Date     09/15/2021    K 4.7 09/15/2021     09/15/2021    CO2 27 09/15/2021    BUN 19 09/15/2021    CREATININE 0.9 09/15/2021    GLUCOSE 102 (H) 09/15/2021    CALCIUM 9.6 09/15/2021    PROT 6.9 09/15/2021    LABALBU 4.7 09/15/2021    BILITOT 1.0 09/15/2021    ALKPHOS 85 09/15/2021    AST 26 09/15/2021    ALT 18 09/15/2021    LABGLOM >60 09/15/2021    GFRAA >60 09/15/2021    AGRATIO 2.1 09/15/2021 GLOB 2.2 09/15/2021     Lab Results   Component Value Date    TSHHS 2.650 05/23/2017    T4FREE 1.24 05/23/2017    FT3 3.2 05/23/2017     Immunology:  Lab Results   Component Value Date    PROT 6.9 09/15/2021    ALBUMINELP 4.1 07/25/2011    LABALPH 0.2 07/25/2011    LABALPH 0.6 07/25/2011    LABBETA 1.0 07/25/2011    GAMGLOB 1.3 07/25/2011     Anemia Panel:  Lab Results   Component Value Date    YOWGJWBZ02 541.2 05/23/2017    FOLATE >20.0 (H) 05/23/2017     Tumor Markers:  Lab Results   Component Value Date    PSA 0.63 09/15/2021      Observations:  No data recorded        Assessment & Plan:    1. He has mild hepatosplenomegaly with some fatty change in his liver and small gallstone. He does not have any signs of splenic sequestration despite his splenomegaly. Ultrasound of the liver and spleen in April 2016 revealed stable splenomegaly, 13.5 cm, with gallstones. Ultrasound of the abdomen in April 2018 revealed borderline splenomegaly to 13.8 cm gallstone and fatty liver. Ultrasound in September 2019 showed splenomegaly at 12.8 cm. CBC and CMP in September 2021 were grossly unremarkable. 2. He has followup with Dr. Pablo Hatch once a year for his history of seminoma. 3. He will follow-up with Dr. Alana Brown for colonoscopy. I advised him to keep his age-appropriate cancer screening up-to-date including colonoscopy. 4.  He has small right inguinal hernia. He has been followed by Dr. Lauretha Lesches. We discussed about diet and weight loss. RTC in 1 year or sooner. All of his questions have been answered for today. Recent imaging and labs were reviewed and discussed with the patient.

## 2021-09-16 ENCOUNTER — HOSPITAL ENCOUNTER (OUTPATIENT)
Dept: INFUSION THERAPY | Age: 61
Discharge: HOME | End: 2021-09-16
Payer: COMMERCIAL

## 2021-09-23 ENCOUNTER — HOSPITAL ENCOUNTER (OUTPATIENT)
Dept: INFUSION THERAPY | Age: 61
Discharge: HOME | End: 2021-09-23
Payer: COMMERCIAL

## 2021-09-23 ENCOUNTER — OFFICE VISIT (OUTPATIENT)
Dept: ONCOLOGY | Age: 61
End: 2021-09-23
Payer: COMMERCIAL

## 2021-09-23 VITALS
DIASTOLIC BLOOD PRESSURE: 71 MMHG | OXYGEN SATURATION: 97 % | SYSTOLIC BLOOD PRESSURE: 132 MMHG | RESPIRATION RATE: 18 BRPM | TEMPERATURE: 98.5 F | BODY MASS INDEX: 32.74 KG/M2 | WEIGHT: 247 LBS | HEART RATE: 92 BPM | HEIGHT: 73 IN

## 2021-09-23 DIAGNOSIS — R16.1 SPLENOMEGALY: Primary | ICD-10-CM

## 2021-09-23 PROCEDURE — 99211 OFF/OP EST MAY X REQ PHY/QHP: CPT

## 2021-09-23 PROCEDURE — 99213 OFFICE O/P EST LOW 20 MIN: CPT | Performed by: INTERNAL MEDICINE

## 2021-09-23 NOTE — PROGRESS NOTES
MA Rooming Questions  Patient: Alma Ibarra  MRN: [id number]    Date: 9/23/2021        1. Do you have any new issues?   no         2. Do you need any refills on medications?    no    3. Have you had any imaging done since your last visit? Yes-CT    4. Have you been hospitalized or seen in the emergency room since your last visit here?   no    5. Did the patient have a depression screening completed today?  No    No data recorded     PHQ-9 Given to (if applicable):               PHQ-9 Score (if applicable):                     [] Positive     []  Negative              Does question #9 need addressed (if applicable)                     [] Yes    []  No               Fareed Salts, CMA

## 2021-09-24 ENCOUNTER — OFFICE VISIT (OUTPATIENT)
Dept: FAMILY MEDICINE CLINIC | Age: 61
End: 2021-09-24
Payer: COMMERCIAL

## 2021-09-24 ENCOUNTER — TELEPHONE (OUTPATIENT)
Dept: FAMILY MEDICINE CLINIC | Age: 61
End: 2021-09-24

## 2021-09-24 VITALS
WEIGHT: 249 LBS | HEIGHT: 73 IN | DIASTOLIC BLOOD PRESSURE: 79 MMHG | OXYGEN SATURATION: 98 % | RESPIRATION RATE: 16 BRPM | SYSTOLIC BLOOD PRESSURE: 121 MMHG | HEART RATE: 90 BPM | BODY MASS INDEX: 33 KG/M2

## 2021-09-24 DIAGNOSIS — A60.02 HERPES SIMPLEX OF MALE GENITALIA: ICD-10-CM

## 2021-09-24 DIAGNOSIS — R16.1 SPLENOMEGALY, NOT ELSEWHERE CLASSIFIED: ICD-10-CM

## 2021-09-24 DIAGNOSIS — K40.90 RIGHT INGUINAL HERNIA: ICD-10-CM

## 2021-09-24 DIAGNOSIS — Z23 NEED FOR VACCINATION: ICD-10-CM

## 2021-09-24 DIAGNOSIS — N40.0 BENIGN PROSTATIC HYPERPLASIA WITHOUT LOWER URINARY TRACT SYMPTOMS: ICD-10-CM

## 2021-09-24 DIAGNOSIS — B35.1 ONYCHOMYCOSIS: ICD-10-CM

## 2021-09-24 DIAGNOSIS — H60.591 OTHER NONINFECTIVE ACUTE OTITIS EXTERNA OF RIGHT EAR: ICD-10-CM

## 2021-09-24 DIAGNOSIS — Z00.00 ANNUAL PHYSICAL EXAM: Primary | ICD-10-CM

## 2021-09-24 PROCEDURE — 90674 CCIIV4 VAC NO PRSV 0.5 ML IM: CPT | Performed by: STUDENT IN AN ORGANIZED HEALTH CARE EDUCATION/TRAINING PROGRAM

## 2021-09-24 PROCEDURE — 99396 PREV VISIT EST AGE 40-64: CPT | Performed by: STUDENT IN AN ORGANIZED HEALTH CARE EDUCATION/TRAINING PROGRAM

## 2021-09-24 PROCEDURE — 90471 IMMUNIZATION ADMIN: CPT | Performed by: STUDENT IN AN ORGANIZED HEALTH CARE EDUCATION/TRAINING PROGRAM

## 2021-09-24 RX ORDER — NEOMYCIN SULFATE, POLYMYXIN B SULFATE, HYDROCORTISONE 3.5; 10000; 1 MG/ML; [USP'U]/ML; MG/ML
2 SOLUTION/ DROPS AURICULAR (OTIC) EVERY 8 HOURS SCHEDULED
Qty: 1 ML | Refills: 0 | Status: SHIPPED | OUTPATIENT
Start: 2021-09-24 | End: 2021-09-27

## 2021-09-24 RX ORDER — VALACYCLOVIR HYDROCHLORIDE 500 MG/1
500 TABLET, FILM COATED ORAL DAILY
Qty: 90 TABLET | Refills: 3 | Status: SHIPPED | OUTPATIENT
Start: 2021-09-24 | End: 2022-09-30 | Stop reason: SDUPTHER

## 2021-09-24 ASSESSMENT — ENCOUNTER SYMPTOMS
NAUSEA: 0
SHORTNESS OF BREATH: 0
ABDOMINAL PAIN: 0
SORE THROAT: 0
BACK PAIN: 0
WHEEZING: 0

## 2021-09-24 NOTE — PROGRESS NOTES
10/3/2021    [facility]    Chief Complaint   Patient presents with    Other     Presenting for yearly wellness visit.  Other     flu shot given       HPI  History was obtained from patient. Toya Wolff is a 62 y.o. male with a PMHx as listed below who presents today for annual exam.  No acute complaints. Immunizations reviewed today other than Covid. Last colonscopy 11/22/2013 hyperplastic polyps 2, benign. Prophylactic use valtrex last breakout 2-3 months ago. Genital.     Follows with Urology Dr. Praneeth philip s/p surgery, hx. Of seminoma    Patient has hx. Of splenomegaly, he has received his pneumonia vaccine. Evaluated by Dr. Guille Wang mild splenomegaly    Hx. Right small right inguinal hernia follows with Dr. Edi West        1. Annual physical exam    2. Benign prostatic hyperplasia without lower urinary tract symptoms    3. Splenomegaly, not elsewhere classified    4. Right inguinal hernia    5. Herpes simplex of male genitalia    6. Other noninfective acute otitis externa of right ear    7. Onychomycosis    8. Need for vaccination             REVIEW OF SYMPTOMS    Review of Systems   Constitutional: Negative for chills and fatigue. HENT: Negative for congestion and sore throat. Respiratory: Negative for shortness of breath and wheezing. Cardiovascular: Negative for chest pain and palpitations. Gastrointestinal: Negative for abdominal pain and nausea. Genitourinary: Negative for frequency and urgency. Musculoskeletal: Negative for arthralgias and back pain. Neurological: Negative for light-headedness.        PAST MEDICAL HISTORY  Past Medical History:   Diagnosis Date    BPH (benign prostatic hyperplasia)     Cancer [facility])     Testicular    Herpes simplex of male genitalia     Urinary frequency        FAMILY HISTORY  Family History   Problem Relation Age of Onset    Heart Disease Mother     Heart Disease Father     Cancer Father         prostate       SOCIAL HISTORY  Social History  VITAMIN D PO Take by mouth      VITAMIN E PO Take by mouth      Multiple Vitamins-Minerals (THERAPEUTIC MULTIVITAMIN-MINERALS) tablet Take 1 tablet by mouth daily. No current facility-administered medications for this visit. ALLERGIES  No Known Allergies    PHYSICAL EXAM    /79   Pulse 90   Resp 16   Ht 6' 1\" (1.854 m)   Wt 249 lb (112.9 kg)   SpO2 98%   BMI 32.85 kg/m²     Physical Exam  Constitutional:       Appearance: Normal appearance. HENT:      Head: Normocephalic and atraumatic. Eyes:      Extraocular Movements: Extraocular movements intact. Pupils: Pupils are equal, round, and reactive to light. Cardiovascular:      Rate and Rhythm: Normal rate and regular rhythm. Pulses: Normal pulses. Heart sounds: No murmur heard. No friction rub. No gallop. Pulmonary:      Effort: Pulmonary effort is normal.      Breath sounds: Normal breath sounds. Skin:     General: Skin is warm and dry. Neurological:      General: No focal deficit present. Mental Status: He is alert. Psychiatric:         Mood and Affect: Mood normal.         Behavior: Behavior normal.         ASSESSMENT & PLAN    1. Annual physical exam  -Immunizations up-to-date counseled on Covid vaccination  -Labs reviewed excellent cholesterol    2. Benign prostatic hyperplasia without lower urinary tract symptoms  -Stable    3. Splenomegaly, not elsewhere classified  -Has been evaluated by heme-onc in the past mild    4. Right inguinal hernia  -She is being monitored by general surgery stable    5.  Herpes simplex of male genitalia  -Continue daily Valtrex use hears breakouts roughly 2 times a month      Return in about 1 year (around 9/24/2022) for annual.         Electronically signed by [address] [place] [address] DO Mathieu on 10/3/2021

## 2022-03-29 ENCOUNTER — OFFICE VISIT (OUTPATIENT)
Dept: FAMILY MEDICINE CLINIC | Age: 62
End: 2022-03-29
Payer: COMMERCIAL

## 2022-03-29 VITALS
SYSTOLIC BLOOD PRESSURE: 136 MMHG | BODY MASS INDEX: 33.23 KG/M2 | WEIGHT: 250.7 LBS | HEIGHT: 73 IN | OXYGEN SATURATION: 98 % | HEART RATE: 93 BPM | DIASTOLIC BLOOD PRESSURE: 76 MMHG

## 2022-03-29 DIAGNOSIS — M19.022 ARTHRITIS OF LEFT ELBOW: ICD-10-CM

## 2022-03-29 DIAGNOSIS — M70.22 OLECRANON BURSITIS OF LEFT ELBOW: Primary | ICD-10-CM

## 2022-03-29 PROCEDURE — 99213 OFFICE O/P EST LOW 20 MIN: CPT | Performed by: STUDENT IN AN ORGANIZED HEALTH CARE EDUCATION/TRAINING PROGRAM

## 2022-03-29 ASSESSMENT — ENCOUNTER SYMPTOMS
ABDOMINAL PAIN: 0
WHEEZING: 0
SORE THROAT: 0
NAUSEA: 0
SHORTNESS OF BREATH: 0

## 2022-03-29 NOTE — PROGRESS NOTES
4/10/2022    [facility]    Chief Complaint   Patient presents with    Arm Swelling     left elbow, fell a little over a month ago landed on his elbow, believes to be filling up with fluid, was tender after the fall but doesn't cause him pain. HPI  History was obtained from patient. Otilia Clemons is a 62 y.o. male with a PMHx as listed below who presents today for   Swelling left elbox    6 weeks ago fall causing swelling left elbow. Non-tender  Has not happened in the past.   Patient works rower dept usingn elbow a lot    1. Olecranon bursitis of left elbow    2. Arthritis of left elbow             REVIEW OF SYMPTOMS    Review of Systems   Constitutional: Negative for chills and fatigue. HENT: Negative for congestion and sore throat. Respiratory: Negative for shortness of breath and wheezing. Cardiovascular: Negative for chest pain and palpitations. Gastrointestinal: Negative for abdominal pain and nausea. Genitourinary: Negative for frequency and urgency. Neurological: Negative for light-headedness. PAST MEDICAL HISTORY  Past Medical History:   Diagnosis Date    BPH (benign prostatic hyperplasia)     [facility]     Testicular    Herpes simplex of male genitalia     Urinary frequency        FAMILY HISTORY  Family History   Problem Relation Age of Onset    Heart Disease Mother     Heart Disease Father     Cancer Father         prostate       SOCIAL HISTORY  Social History     Socioeconomic History    Marital status:      Spouse name: None    Number of children: None    Years of education: None    Highest education level: None   Occupational History    None   Tobacco Use    Smoking status: Never Smoker    Smokeless tobacco: Never Used   Vaping Use    Vaping Use: Never used   Substance and Sexual Activity    Alcohol use:  Yes     Alcohol/week: 1.0 standard drink     Types: 1 Cans of beer per week    Drug use: No    Sexual activity: Never   Other Topics Concern    None Social History Narrative    None     Social Determinants of Health     Financial Resource Strain:     Difficulty of Paying Living Expenses: Not on file   Food Insecurity:     Worried About Running Out of Food in the Last Year: Not on file    Monik of Food in the Last Year: Not on file   Transportation Needs:     Lack of Transportation (Medical): Not on file    Lack of Transportation (Non-Medical):  Not on file   Physical Activity:     Days of Exercise per Week: Not on file    Minutes of Exercise per Session: Not on file   Stress:     Feeling of Stress : Not on file   Social Connections:     Frequency of Communication with Friends and Family: Not on file    Frequency of Social Gatherings with Friends and Family: Not on file    Attends Nondenominational Services: Not on file    Active Member of [address] [place] [address] [employer] or Organizations: Not on file    Attends Club or Organization Meetings: Not on file    Marital Status: Not on file   Intimate Partner Violence:     Fear of Current or Ex-Partner: Not on file    Emotionally Abused: Not on file    Physically Abused: Not on file    Sexually Abused: Not on file   Housing Stability:     Unable to Pay for Housing in the Last Year: Not on file    Number of [place] in the Last Year: Not on file    Unstable Housing in the Last Year: Not on file        SURGICAL HISTORY  Past Surgical History:   Procedure Laterality Date    COLONOSCOPY  11-22-13    poly's    HERNIA REPAIR Left 1978    inguinal    PROSTATE SURGERY      TESTICLE REMOVAL Left     TESTICLE REMOVAL      TONSILLECTOMY                   CURRENT MEDICATIONS  Current Outpatient Medications   Medication Sig Dispense Refill    valACYclovir (VALTREX) 500 MG tablet Take 1 tablet by mouth daily 90 tablet 3    Ciclopirox 8 % KIT Apply to toe nail thin layer daily for 6 weeks 34.6 mL 0    VITAMIN D PO Take by mouth      VITAMIN E PO Take by mouth      Multiple Vitamins-Minerals (THERAPEUTIC MULTIVITAMIN-MINERALS) tablet Take 1 tablet by mouth daily. No current facility-administered medications for this visit. ALLERGIES  No Known Allergies    PHYSICAL EXAM    /76 (Site: Left Upper Arm, Position: Sitting, Cuff Size: Medium Adult)   Pulse 93   Ht 6' 1\" (1.854 m)   Wt 250 lb 11.2 oz (113.7 kg)   SpO2 98%   BMI 33.08 kg/m²     Physical Exam  Constitutional:       Appearance: Normal appearance. HENT:      Head: Normocephalic and atraumatic. Eyes:      Extraocular Movements: Extraocular movements intact. Pupils: Pupils are equal, round, and reactive to light. Cardiovascular:      Rate and Rhythm: Normal rate and regular rhythm. Pulses: Normal pulses. Heart sounds: No murmur heard. No friction rub. No gallop. Pulmonary:      Effort: Pulmonary effort is normal.      Breath sounds: Normal breath sounds. Musculoskeletal:         General: Swelling (left elbow) present. No tenderness, deformity or signs of injury. Skin:     General: Skin is warm and dry. Neurological:      General: No focal deficit present. Mental Status: He is alert. Psychiatric:         Mood and Affect: Mood normal.         Behavior: Behavior normal.         ASSESSMENT & PLAN    1. Olecranon bursitis of left elbow  Likely OA, consider culture, discussed compression wrap patinet interested in drainage will send to Ortho for eval    2. Arthritis of left elbow  - Katie Corona DO, Orthopedic Surgery, [facility]      Return for as scheduled.          Electronically signed by Jennifer Rust DO on 4/10/2022

## 2022-03-29 NOTE — LETTER
[address] [place] [address] Carolyn Andre  Phone: [phone]  Fax: [phone]    Baljit Curran DO        March 29, 2022     Patient: Sandhya Parks   YOB: 1960   Date of Visit: 3/29/2022       To Whom it May Concern:    Kayla Jeter was seen in my clinic on 3/29/2022. He may return to work on 3/29/2022. If you have any questions or concerns, please don't hesitate to call.     Sincerely,         Alma Bazan, DO

## 2022-04-06 ENCOUNTER — OFFICE VISIT (OUTPATIENT)
Dept: ORTHOPEDIC SURGERY | Age: 62
End: 2022-04-06
Payer: COMMERCIAL

## 2022-04-06 VITALS
RESPIRATION RATE: 16 BRPM | OXYGEN SATURATION: 97 % | BODY MASS INDEX: 31.81 KG/M2 | HEART RATE: 102 BPM | WEIGHT: 240 LBS | HEIGHT: 73 IN

## 2022-04-06 DIAGNOSIS — M70.22 OLECRANON BURSITIS OF LEFT ELBOW: Primary | ICD-10-CM

## 2022-04-06 PROCEDURE — 99202 OFFICE O/P NEW SF 15 MIN: CPT | Performed by: PHYSICIAN ASSISTANT

## 2022-04-06 ASSESSMENT — ENCOUNTER SYMPTOMS
RESPIRATORY NEGATIVE: 1
EYES NEGATIVE: 1
GASTROINTESTINAL NEGATIVE: 1

## 2022-04-06 NOTE — PROGRESS NOTES
Review of Systems   Constitutional: Negative. HENT: Negative. Eyes: Negative. Respiratory: Negative. Cardiovascular: Negative. Gastrointestinal: Negative. Genitourinary: Negative. Musculoskeletal: Negative. Skin: Negative. Neurological: Negative. Psychiatric/Behavioral: Negative. Brown Murguia is a 62 y.o. male that presents to the office today to have her left elbow evaluated. Patient states that about 6 weeks or more ago he slipped on some ice fell and hit his elbow had some pain in the elbow at that time but no significant swelling and he did not get it checked out. He states that recently about 3 to 4 weeks ago he noticed that there was some swelling on the posterior part of his elbow and his wife asked him what it was therefore he went to get checked out. Past Medical History:   Diagnosis Date    BPH (benign prostatic hyperplasia)     Cancer ([facility]     Testicular    Herpes simplex of male genitalia     Urinary frequency        Past Surgical History:   Procedure Laterality Date    COLONOSCOPY  11-22-13    poly's    HERNIA REPAIR Left 1978    inguinal    PROSTATE SURGERY      TESTICLE REMOVAL Left     TESTICLE REMOVAL      TONSILLECTOMY         Family History   Problem Relation Age of Onset    Heart Disease Mother     Heart Disease Father     Cancer Father         prostate       Social History     Socioeconomic History    Marital status:      Spouse name: None    Number of children: None    Years of education: None    Highest education level: None   Occupational History    None   Tobacco Use    Smoking status: Never Smoker    Smokeless tobacco: Never Used   Vaping Use    Vaping Use: Never used   Substance and Sexual Activity    Alcohol use:  Yes     Alcohol/week: 1.0 standard drink     Types: 1 Cans of beer per week    Drug use: No    Sexual activity: Never   Other Topics Concern    None   Social History Narrative    None     Social Determinants of Health     Financial Resource Strain:     Difficulty of Paying Living Expenses: Not on file   Food Insecurity:     Worried About Running Out of Food in the Last Year: Not on file    Monik of Food in the Last Year: Not on file   Transportation Needs:     Lack of Transportation (Medical): Not on file    Lack of Transportation (Non-Medical): Not on file   Physical Activity:     Days of Exercise per Week: Not on file    Minutes of Exercise per Session: Not on file   Stress:     Feeling of Stress : Not on file   Social Connections:     Frequency of Communication with Friends and Family: Not on file    Frequency of Social Gatherings with Friends and Family: Not on file    Attends Confucianism Services: Not on file    Active Member of [address] [place] [address] [employer] or Organizations: Not on file    Attends Club or Organization Meetings: Not on file    Marital Status: Not on file   Intimate Partner Violence:     Fear of Current or Ex-Partner: Not on file    Emotionally Abused: Not on file    Physically Abused: Not on file    Sexually Abused: Not on file   Housing Stability:     Unable to Pay for Housing in the Last Year: Not on file    Number of [place] in the Last Year: Not on file    Unstable Housing in the Last Year: Not on file       Current Outpatient Medications   Medication Sig Dispense Refill    valACYclovir (VALTREX) 500 MG tablet Take 1 tablet by mouth daily 90 tablet 3    Ciclopirox 8 % KIT Apply to toe nail thin layer daily for 6 weeks 34.6 mL 0    VITAMIN D PO Take by mouth      VITAMIN E PO Take by mouth      Multiple Vitamins-Minerals (THERAPEUTIC MULTIVITAMIN-MINERALS) tablet Take 1 tablet by mouth daily. No current facility-administered medications for this visit.        No Known Allergies    Review of Systems:  See above      Physical Exam:   Pulse 102   Resp 16   Ht 6' 1\" (1.854 m)   Wt 240 lb (108.9 kg)   SpO2 97%   BMI 31.66 kg/m²        Gait is Normal. Gen/Psych:Examination reveals a pleasant individual in no acute distress. The patient is oriented to time, place and person. The patient's mood and affect are appropriate. Patient appears well nourished. Body habitus is mildly overweight    Head: Head is atraumatic normocephalic,  ears are symmetric,     Eyes: Eyes show equal pupils bilaterally, extraocular muscles intact    Ears:  Hearing is intact to normal voice at 5 feet    Nose: Nares are patent bilaterally, no epistaxis,no rhinorrhea     Lymph:  No obvious lymphedema in bilateral upper extremities     Skin intact in bilateral upper extremities with no ulcerations, lesions, rash, erythema. Vascular: There are no varicosities in bilateral upper  extremities, sensation intact to light touch over bilateral upper extremities. Left elbow: Inspection: Mild edema over the olecranon with no erythema. Palpation: Mild to moderate fluid collection within the olecranon bursa, nontender to palpation. Range of motion: 0-150 degrees, strength is 5/5 with resisted flexion and extension. Outsiderecord review: Office notes reviewed  Imaging studies: Views of the left elbow taken and reviewed in the office today show no acute fractures, no degenerative changes, no joint effusion. Very minimal soft tissue swelling over the olecranon consistent with olecranon bursitis. The official read and interpretation of these x-rays will be done by the the [facility]       Impression:    Diagnosis Orders   1.  Olecranon bursitis of left elbow           Plan:    Patient Instructions   May use Aleve twice a a day for 2 weeks  Use a compression wrap for 2 weeks  Follow up if needed

## 2022-08-26 NOTE — TELEPHONE ENCOUNTER
Patient called and stated that  had already given him the results of the US at his appt. Patient is scheduled with Dr Christina Bryant 4-30-21.  Patient requested that the results of the US be faxed to Dr Lupillo Parish no

## 2022-08-28 NOTE — PROGRESS NOTES
Patient Name: Leatha Cavanaugh  Patient : 1960  Patient MRN: [id number]     Primary Oncologist: Angélica Flowers MD  Referring Provider: Kely Conde MD     Date of Service: 2022      Chief Complaint:   Chief Complaint   Patient presents with    Follow-up       He came in for follow-up visit. Patient Active Problem List:     BPH (benign prostatic hyperplasia)     Herpes simplex of male genitalia     Splenomegaly, not elsewhere classified     Other lichen planus     HPI:   Leydi Shelton is a pleasant 23-year-old  male patient who was initially referred in 2011 for evaluation of splenomegaly. He had a remote history of EBV. I reviewed his CAT scan at the time with the radiologist who felt that he had an elongated spleen and the volume of the spleen was rather normal.     Serum protein electrophoresis study in 2011 was negative and CBCD was normal, with no signs of hypersplenism. Followup ultrasound of the spleen in 2012 showed no significant change with mild splenomegaly, measuring about 15 cm. Blood test on April 10, 2013 showed white cell count of 8.6, hemoglobin of 14.6, platelet 192, MCV 95. CMP was benign, except for slightly elevated AST at 40. This slightly elevated liver enzyme could be related to the medications versus alcohol. He admitted that he drinks beer occasionally. Blood test in 2013 showed white cell count at 6.5, hemoglobin 15.4, platelet 805. CMP was within normal limits, except for glucose of 107. PSA was 1.29. Abdominal ultrasound in 2013 showed stable mild to moderate splenomegaly at 14.5 cm. There is no sign of splenic sequestration. Colonoscopy in 2013 by Dr. Bi Sloan showed a single, less than 5 mm polyp, found in  rectum, which was removed. The pathology report was consistent with a hyperplastic polyp.   He had green light laser surgery for the BPH on 2013, by Dr. Domenic Patel and since then he stopped taking the Flomax. Blood test on April 9, 2014 showed normal CBC and CMP. He lost about 40 pounds over one year period of time. Blood test in October 2014 showed white cell count of 6.9, hemoglobin 15.7, platelet 763. CMP was within normal limits, except for total bilirubin 1.3. Blood test in April 2015 showed normal CBC and CMP. A followup ultrasound of the liver and spleen showed mild hepatosplenomegaly with the liver measuring 19.4 and spleen 13.8 cm. The liver finding was compatible with fatty change. Blood test in October 2015 showed no evidence of splenic sequestration, despite his reported splenomegaly. White cell count was 7.2, hemoglobin 15, platelet 504. CMP was benign, with total bilirubin 1.1. Blood test in April 2016 revealed normal CBC and CMP. There is no sign other splenic sequestration. His ultrasound of the liver and spleen revealed stable splenomegaly, 13.5 cm, with gallstones. He was diagnosed with lichen planus and was treated with a local steroid by the dermatologist, Dr. Alex Tran. He also received steroid injections. Blood test on April 12, 2017, revealed white cell 7.3, hemoglobin 14.8, hematocrit 48.2. MCV was slightly elevated at 102.1, platelet 781. He is agreeable to have his CBC checked in one month with folic acid level, N10 level, and TSH. CMP was benign, except for the total bilirubin 1.2. I doubt that he has hemolytic anemia. Ultrasound of the abdomen in April 2017 revealed cholelithiasis without gallbladder wall thickening and slight decrease in size of the spleen measuring 13.5 by 4.7 by 5.9. Liver was normal in echogenicity. Ultrasound of abdomen in April 20 18th revealed borderline splenomegaly 13.8 cm, cholelithiasis and fatty liver. Labs in April 2019 were reviewed and stable. Ultrasound in September 2020 showed mild splenomegaly 12.8 cm in length with cholelithiasis and likely hepatic fatty infiltration.   We discussed about healthy diet. He denies any symptoms to suggest cholecystitis. CBC was grossly unremarkable. CMP was stable. Reportedly he had cystoscopy in 2019 and was found to have recurrent BPH. He will follow-up with Dr. Jennifer Sprague for colonoscopy. On September 23, 2021 he came in for follow-up visit. He had Covid pneumonia in November 2020. He was hospitalized and received plasma and remdesivir. He was seen by Dr. Jane Caro for right inguinal hernia. He will be watched closely. Ultrasound of abdomen and scrotum on April 16, 2021 showed  Status post left testicle removal.   Right inguinal hernia    CT abdomen and pelvis on May 6, 2021 showed      CBC and CMP in September 2021 were grossly unremarkable. 9/22/2022 he came in for follow up visit. CMP and CBC in 9/2022 grossly stable for him  He follows with dermatologist yearly. He changes job and becomes farmer. I recommend sun screen. Reportedly colonoscopy is due in 2023. He denied early satiety. No acute pain. Denies any nausea, vomiting or diarrhea. No fever or chills. No chest pain, shortness of breath or palpitation. No headaches or dizzy spell. No specific bone pain. No melena or hematochezia. Denied any dysuria or hematuria. 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 He has a history of hernia surgery, BPH diagnosed in 2006, and herpetic simplex viral illness diagnosed in 1990. FAMILY HISTORY:  Father had prostate cancer, paternal grandfather had prostate cancer, maternal grandfather had lung cancer, and maternal grandmother had questionable ovarian cancer. SOCIAL HISTORY:  He denied any history of smoking. He drinks beer occasionally. Review of Systems: \"Per interval history; otherwise 10 point ROS is negative. \"     Vital Signs:  /69 (Site: Right Upper Arm, Position: Sitting, Cuff Size: Large Adult)   Pulse 91   Temp 97.1 °F (36.2 °C) (Infrared)   Resp 16   Ht 6' 1\" (1.854 m)   Wt 244 lb (110.7 kg)   SpO2 98%   BMI 32.19 kg/m²     Physical Exam:  CONSTITUTIONAL: awake, alert, cooperative, no apparent distress   EYES: pupils equal, round and reactive to light. Sclera clear and conjunctiva normal  ENT: Normocephalic, without obvious abnormality, atraumatic  NECK: supple, symmetrical, no jugular venous distension and no carotid bruits   HEMATOLOGIC/LYMPHATIC: no cervical, supraclavicular or axillary lymphadenopathy   LUNGS: no increased work of breathing and clear to auscultation   CARDIOVASCULAR: regular rate and rhythm, normal S1 and S2, no murmur   ABDOMEN: normal bowel sound, soft, non-distended, non-tender, no masses palpated, no hepatosplenomegaly   MUSCULOSKELETAL: full range of motion noted, tone is normal  NEUROLOGIC: Motor skills grossly intact. CN II - XII grossly intact. SKIN: No jaundice. appears intact   EXTREMITIES: no LE edema. No cyanosis.      Labs:  Hematology:  Lab Results   Component Value Date    WBC 7.5 09/15/2022    RBC 5.04 09/15/2022    HGB 15.4 09/15/2022    HCT 47.8 09/15/2022    MCV 94.8 09/15/2022    MCH 30.6 09/15/2022    MCHC 32.2 09/15/2022    RDW 13.3 09/15/2022     09/15/2022    MPV 10.7 09/15/2022    BANDSPCT 3 (L) 11/11/2020    SEGSPCT 65.9 09/15/2022    EOSRELPCT 2.9 09/15/2022    BASOPCT 0.7 09/15/2022    LYMPHOPCT 24.5 09/15/2022    MONOPCT 6.0 (H) 09/15/2022    BANDABS 0.34 11/11/2020    SEGSABS 4.9 09/15/2022    EOSABS 0.2 09/15/2022    BASOSABS 0.1 09/15/2022    LYMPHSABS 1.8 09/15/2022    MONOSABS 0.5 09/15/2022    DIFFTYPE AUTOMATED DIFFERENTIAL 09/15/2022    ANISOCYTOSIS 1+ 11/11/2020    WBCMORP OCCASIONAL 04/12/2018    PLTM OCCASIONAL 11/11/2020     Chemistry:  Lab Results   Component Value Date     09/15/2022    K 4.8 09/15/2022     09/15/2022    CO2 27 09/15/2022    BUN 17 09/15/2022    CREATININE 1.0 09/15/2022    GLUCOSE 111 (H) 09/15/2022    CALCIUM 9.6 09/15/2022    PROT 6.4 09/15/2022 LABALBU 4.5 09/15/2022    BILITOT 1.4 (H) 09/15/2022    ALKPHOS 78 09/15/2022    AST 23 09/15/2022    ALT 16 09/15/2022    LABGLOM >60 09/15/2022    GFRAA >60 09/15/2022    AGRATIO 2.1 09/15/2021    GLOB 2.2 09/15/2021     Lab Results   Component Value Date    TSHHS 2.650 05/23/2017    T4FREE 1.24 05/23/2017    FT3 3.2 05/23/2017     Immunology:  Lab Results   Component Value Date    PROT 6.4 09/15/2022    ALBUMINELP 4.1 07/25/2011    LABALPH 0.2 07/25/2011    LABALPH 0.6 07/25/2011    LABBETA 1.0 07/25/2011    GAMGLOB 1.3 07/25/2011     Anemia Panel:  Lab Results   Component Value Date    VMGBXXUA43 541.2 05/23/2017    FOLATE >20.0 (H) 05/23/2017     Tumor Markers:  Lab Results   Component Value Date    PSA 0.63 09/15/2021      Observations:  PHQ-9 Total Score: 0 (9/22/2022  2:03 PM)      Assessment & Plan:  1. He has mild hepatosplenomegaly with some fatty change in his liver and small gallstone. He does not have any signs of splenic sequestration despite his splenomegaly. Ultrasound of the liver and spleen in April 2016 revealed stable splenomegaly, 13.5 cm, with gallstones. Ultrasound of the abdomen in April 2018 revealed borderline splenomegaly to 13.8 cm gallstone and fatty liver. Ultrasound in September 2019 showed splenomegaly at 12.8 cm. CBC and CMP in September 2021 were grossly unremarkable. CBC and CMP in 9/2022 grossly stable for him. He is asymptomatic. He is agreeable to have repeat labs prior to next OV in one year. 2. He has followup with Dr. Jean-Paul Newman once a year for his history of seminoma. 3. He will follow-up with Dr. Cherelle Foley for colonoscopy. I advised him to keep his age-appropriate cancer screening up-to-date including colonoscopy. 4.  He has small right inguinal hernia. He has been followed by Dr. Cain Glover. We discussed about diet and weight loss. RTC in 1 year or sooner. All of his questions have been answered for today.     Recent imaging and labs were reviewed and discussed with the patient.

## 2022-09-15 ENCOUNTER — HOSPITAL ENCOUNTER (OUTPATIENT)
Dept: INFUSION THERAPY | Age: 62
Discharge: HOME | End: 2022-09-15
Payer: COMMERCIAL

## 2022-09-15 DIAGNOSIS — R16.1 SPLENOMEGALY: ICD-10-CM

## 2022-09-15 LAB
ALBUMIN SERPL-MCNC: 4.5 GM/DL (ref 3.4–5)
ALP BLD-CCNC: 78 IU/L (ref 40–128)
ALT SERPL-CCNC: 16 U/L (ref 10–40)
ANION GAP SERPL CALCULATED.3IONS-SCNC: 10 MMOL/L (ref 4–16)
AST SERPL-CCNC: 23 IU/L (ref 15–37)
BASOPHILS ABSOLUTE: 0.1 K/CU MM
BASOPHILS RELATIVE PERCENT: 0.7 % (ref 0–1)
BILIRUB SERPL-MCNC: 1.4 MG/DL (ref 0–1)
BUN BLDV-MCNC: 17 MG/DL (ref 6–23)
CALCIUM SERPL-MCNC: 9.6 MG/DL (ref 8.3–10.6)
CHLORIDE BLD-SCNC: 101 MMOL/L (ref 99–110)
CO2: 27 MMOL/L (ref 21–32)
CREAT SERPL-MCNC: 1 MG/DL (ref 0.9–1.3)
DIFFERENTIAL TYPE: ABNORMAL
EOSINOPHILS ABSOLUTE: 0.2 K/CU MM
EOSINOPHILS RELATIVE PERCENT: 2.9 % (ref 0–3)
GFR AFRICAN AMERICAN: >60 ML/MIN/1.73M2
GFR NON-AFRICAN AMERICAN: >60 ML/MIN/1.73M2
GLUCOSE BLD-MCNC: 111 MG/DL (ref 70–99)
HCT VFR BLD CALC: 47.8 % (ref 42–52)
HEMOGLOBIN: 15.4 GM/DL (ref 13.5–18)
LYMPHOCYTES ABSOLUTE: 1.8 K/CU MM
LYMPHOCYTES RELATIVE PERCENT: 24.5 % (ref 24–44)
MCH RBC QN AUTO: 30.6 PG (ref 27–31)
MCHC RBC AUTO-ENTMCNC: 32.2 % (ref 32–36)
MCV RBC AUTO: 94.8 FL (ref 78–100)
MONOCYTES ABSOLUTE: 0.5 K/CU MM
MONOCYTES RELATIVE PERCENT: 6 % (ref 0–4)
PDW BLD-RTO: 13.3 % (ref 11.7–14.9)
PLATELET # BLD: 273 K/CU MM (ref 140–440)
PMV BLD AUTO: 10.7 FL (ref 7.5–11.1)
POTASSIUM SERPL-SCNC: 4.8 MMOL/L (ref 3.5–5.1)
RBC # BLD: 5.04 M/CU MM (ref 4.6–6.2)
SEGMENTED NEUTROPHILS ABSOLUTE COUNT: 4.9 K/CU MM
SEGMENTED NEUTROPHILS RELATIVE PERCENT: 65.9 % (ref 36–66)
SODIUM BLD-SCNC: 138 MMOL/L (ref 135–145)
TOTAL PROTEIN: 6.4 GM/DL (ref 6.4–8.2)
WBC # BLD: 7.5 K/CU MM (ref 4–10.5)

## 2022-09-15 PROCEDURE — 80053 COMPREHEN METABOLIC PANEL: CPT

## 2022-09-15 PROCEDURE — 85025 COMPLETE CBC W/AUTO DIFF WBC: CPT

## 2022-09-15 PROCEDURE — 36415 COLL VENOUS BLD VENIPUNCTURE: CPT

## 2022-09-22 ENCOUNTER — OFFICE VISIT (OUTPATIENT)
Dept: ONCOLOGY | Age: 62
End: 2022-09-22
Payer: COMMERCIAL

## 2022-09-22 ENCOUNTER — HOSPITAL ENCOUNTER (OUTPATIENT)
Dept: INFUSION THERAPY | Age: 62
Discharge: HOME | End: 2022-09-22
Payer: COMMERCIAL

## 2022-09-22 VITALS
WEIGHT: 244 LBS | TEMPERATURE: 97.1 F | DIASTOLIC BLOOD PRESSURE: 69 MMHG | HEART RATE: 91 BPM | OXYGEN SATURATION: 98 % | HEIGHT: 73 IN | RESPIRATION RATE: 16 BRPM | BODY MASS INDEX: 32.34 KG/M2 | SYSTOLIC BLOOD PRESSURE: 135 MMHG

## 2022-09-22 DIAGNOSIS — R16.1 SPLENOMEGALY: Primary | ICD-10-CM

## 2022-09-22 PROCEDURE — 99211 OFF/OP EST MAY X REQ PHY/QHP: CPT

## 2022-09-22 PROCEDURE — 99213 OFFICE O/P EST LOW 20 MIN: CPT | Performed by: INTERNAL MEDICINE

## 2022-09-22 ASSESSMENT — PATIENT HEALTH QUESTIONNAIRE - PHQ9
SUM OF ALL RESPONSES TO PHQ QUESTIONS 1-9: 0
SUM OF ALL RESPONSES TO PHQ QUESTIONS 1-9: 0
SUM OF ALL RESPONSES TO PHQ9 QUESTIONS 1 & 2: 0
2. FEELING DOWN, DEPRESSED OR HOPELESS: 0
1. LITTLE INTEREST OR PLEASURE IN DOING THINGS: 0
SUM OF ALL RESPONSES TO PHQ QUESTIONS 1-9: 0
SUM OF ALL RESPONSES TO PHQ QUESTIONS 1-9: 0

## 2022-09-22 NOTE — PROGRESS NOTES
MA Rooming Questions  Patient: Gordon Allison  MRN: [id number]    Date: 9/22/2022        1. Do you have any new issues?   no         2. Do you need any refills on medications?    no    3. Have you had any imaging done since your last visit?   no    4. Have you been hospitalized or seen in the emergency room since your last visit here?   no    5. Did the patient have a depression screening completed today?  Yes    PHQ-9 Total Score: 0 (9/22/2022  2:03 PM)       PHQ-9 Given to (if applicable):               PHQ-9 Score (if applicable):                     [] Positive     [x]  Negative              Does question #9 need addressed (if applicable)                     [] Yes    []  No               Kalen Mao MA

## 2022-09-23 DIAGNOSIS — R16.1 SPLENOMEGALY: ICD-10-CM

## 2022-09-23 LAB
A/G RATIO: 3.1 (ref 1.1–2.2)
ALBUMIN SERPL-MCNC: 5.2 G/DL (ref 3.4–5)
ALP BLD-CCNC: 73 U/L (ref 40–129)
ALT SERPL-CCNC: 15 U/L (ref 10–40)
ANION GAP SERPL CALCULATED.3IONS-SCNC: 11 MMOL/L (ref 3–16)
AST SERPL-CCNC: 22 U/L (ref 15–37)
BASOPHILS ABSOLUTE: 0 K/UL (ref 0–0.2)
BASOPHILS RELATIVE PERCENT: 0.6 %
BILIRUB SERPL-MCNC: 1.6 MG/DL (ref 0–1)
BUN BLDV-MCNC: 18 MG/DL (ref 7–20)
CALCIUM SERPL-MCNC: 9.9 MG/DL (ref 8.3–10.6)
CHLORIDE BLD-SCNC: 103 MMOL/L (ref 99–110)
CO2: 27 MMOL/L (ref 21–32)
CREAT SERPL-MCNC: 1.1 MG/DL (ref 0.8–1.3)
EOSINOPHILS ABSOLUTE: 0.1 K/UL (ref 0–0.6)
EOSINOPHILS RELATIVE PERCENT: 1.5 %
GFR AFRICAN AMERICAN: >60
GFR NON-AFRICAN AMERICAN: >60
GLUCOSE BLD-MCNC: 106 MG/DL (ref 70–99)
HCT VFR BLD CALC: 47.4 % (ref 40.5–52.5)
HEMOGLOBIN: 15.8 G/DL (ref 13.5–17.5)
LYMPHOCYTES ABSOLUTE: 1.4 K/UL (ref 1–5.1)
LYMPHOCYTES RELATIVE PERCENT: 22.5 %
MCH RBC QN AUTO: 30.9 PG (ref 26–34)
MCHC RBC AUTO-ENTMCNC: 33.2 G/DL (ref 31–36)
MCV RBC AUTO: 93.1 FL (ref 80–100)
MONOCYTES ABSOLUTE: 0.5 K/UL (ref 0–1.3)
MONOCYTES RELATIVE PERCENT: 7.6 %
NEUTROPHILS ABSOLUTE: 4.1 K/UL (ref 1.7–7.7)
NEUTROPHILS RELATIVE PERCENT: 67.8 %
PDW BLD-RTO: 13.3 % (ref 12.4–15.4)
PLATELET # BLD: 267 K/UL (ref 135–450)
PMV BLD AUTO: 9.3 FL (ref 5–10.5)
POTASSIUM SERPL-SCNC: 5.1 MMOL/L (ref 3.5–5.1)
RBC # BLD: 5.09 M/UL (ref 4.2–5.9)
SODIUM BLD-SCNC: 141 MMOL/L (ref 136–145)
TOTAL PROTEIN: 6.9 G/DL (ref 6.4–8.2)
WBC # BLD: 6 K/UL (ref 4–11)

## 2022-09-30 ENCOUNTER — OFFICE VISIT (OUTPATIENT)
Dept: FAMILY MEDICINE CLINIC | Age: 62
End: 2022-09-30
Payer: COMMERCIAL

## 2022-09-30 VITALS
DIASTOLIC BLOOD PRESSURE: 68 MMHG | SYSTOLIC BLOOD PRESSURE: 112 MMHG | HEIGHT: 73 IN | WEIGHT: 245.3 LBS | OXYGEN SATURATION: 96 % | HEART RATE: 90 BPM | RESPIRATION RATE: 16 BRPM | BODY MASS INDEX: 32.51 KG/M2

## 2022-09-30 DIAGNOSIS — Z23 NEED FOR INFLUENZA VACCINATION: ICD-10-CM

## 2022-09-30 DIAGNOSIS — Z00.00 ANNUAL PHYSICAL EXAM: Primary | ICD-10-CM

## 2022-09-30 DIAGNOSIS — Z23 NEED FOR VACCINATION: ICD-10-CM

## 2022-09-30 DIAGNOSIS — Z13.220 LIPID SCREENING: ICD-10-CM

## 2022-09-30 DIAGNOSIS — A60.02 HERPES SIMPLEX OF MALE GENITALIA: ICD-10-CM

## 2022-09-30 DIAGNOSIS — R73.01 IMPAIRED FASTING GLUCOSE: ICD-10-CM

## 2022-09-30 DIAGNOSIS — N40.0 BENIGN PROSTATIC HYPERPLASIA WITHOUT LOWER URINARY TRACT SYMPTOMS: ICD-10-CM

## 2022-09-30 PROCEDURE — 90471 IMMUNIZATION ADMIN: CPT | Performed by: STUDENT IN AN ORGANIZED HEALTH CARE EDUCATION/TRAINING PROGRAM

## 2022-09-30 PROCEDURE — 90674 CCIIV4 VAC NO PRSV 0.5 ML IM: CPT | Performed by: STUDENT IN AN ORGANIZED HEALTH CARE EDUCATION/TRAINING PROGRAM

## 2022-09-30 PROCEDURE — 99396 PREV VISIT EST AGE 40-64: CPT | Performed by: STUDENT IN AN ORGANIZED HEALTH CARE EDUCATION/TRAINING PROGRAM

## 2022-09-30 RX ORDER — VALACYCLOVIR HYDROCHLORIDE 500 MG/1
500 TABLET, FILM COATED ORAL DAILY
Qty: 90 TABLET | Refills: 3 | Status: SHIPPED | OUTPATIENT
Start: 2022-09-30 | End: 2022-09-30 | Stop reason: SDUPTHER

## 2022-09-30 RX ORDER — VALACYCLOVIR HYDROCHLORIDE 500 MG/1
500 TABLET, FILM COATED ORAL DAILY
Qty: 90 TABLET | Refills: 3 | Status: SHIPPED | OUTPATIENT
Start: 2022-09-30 | End: 2023-09-30

## 2022-09-30 ASSESSMENT — ENCOUNTER SYMPTOMS
SORE THROAT: 0
ABDOMINAL PAIN: 0
SHORTNESS OF BREATH: 0
NAUSEA: 0
WHEEZING: 0

## 2022-09-30 NOTE — PROGRESS NOTES
9/30/2022    [facility]    Chief Complaint   Patient presents with    Other     Wellness for work   Flu shot        HPI  History was obtained from patient. Toya Wolff is a 65 y.o. male with a PMHx as listed below who presents today for annual physical examination. 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 Of hepatosplenomegaly, stable follows [facility] Dr. Shine Borden    1. Annual physical exam    2. Need for influenza vaccination    3. Benign prostatic hyperplasia without lower urinary tract symptoms    4. Lipid screening    5. Herpes simplex of male genitalia    6. Need for vaccination             REVIEW OF SYMPTOMS    Review of Systems   Constitutional:  Negative for chills and fatigue. HENT:  Negative for congestion and sore throat. Respiratory:  Negative for shortness of breath and wheezing. Cardiovascular:  Negative for chest pain and palpitations. Gastrointestinal:  Negative for abdominal pain and nausea. Genitourinary:  Negative for frequency and urgency. Neurological:  Negative for light-headedness. 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    Herpes simplex of male genitalia     Urinary frequency        FAMILY HISTORY  Family History   Problem Relation Age of Onset    Heart Disease Mother     Heart Disease Father     Cancer Father         prostate       SOCIAL HISTORY  Social History     Socioeconomic History    Marital status:    Tobacco Use    Smoking status: Never    Smokeless tobacco: Never   Vaping Use    Vaping Use: Never used   Substance and Sexual Activity    Alcohol use:  Yes     Alcohol/week: 1.0 standard drink     Types: 1 Cans of beer per week    Drug use: No    Sexual activity: Never        SURGICAL HISTORY  Past Surgical History:   Procedure Laterality Date    COLONOSCOPY  11-22-13    poly's    HERNIA REPAIR Left 1978    inguinal    PROSTATE SURGERY      TESTICLE REMOVAL Left     TESTICLE REMOVAL      TONSILLECTOMY                   CURRENT MEDICATIONS  Current Outpatient Medications   Medication Sig Dispense Refill    valACYclovir (VALTREX) 500 MG tablet Take 1 tablet by mouth daily 90 tablet 3    VITAMIN D PO Take by mouth      VITAMIN E PO Take by mouth      Multiple Vitamins-Minerals (THERAPEUTIC MULTIVITAMIN-MINERALS) tablet Take 1 tablet by mouth daily. Ciclopirox 8 % KIT Apply to toe nail thin layer daily for 6 weeks (Patient not taking: Reported on 9/30/2022) 34.6 mL 0     No current facility-administered medications for this visit. ALLERGIES  No Known Allergies    PHYSICAL EXAM    /68 (Site: Left Upper Arm, Position: Sitting, Cuff Size: Large Adult)   Pulse 90   Resp 16   Ht 6' 1\" (1.854 m)   Wt 245 lb 4.8 oz (111.3 kg)   SpO2 96%   BMI 32.36 kg/m²     Physical Exam  Constitutional:       Appearance: Normal appearance. HENT:      Head: Normocephalic and atraumatic. Eyes:      Extraocular Movements: Extraocular movements intact. Pupils: Pupils are equal, round, and reactive to light. Cardiovascular:      Rate and Rhythm: Normal rate and regular rhythm. Pulses: Normal pulses. Heart sounds: No murmur heard. No friction rub. No gallop. Pulmonary:      Effort: Pulmonary effort is normal.      Breath sounds: Normal breath sounds. Abdominal:      General: Abdomen is flat. Bowel sounds are normal. There is no distension. Palpations: Abdomen is soft. Tenderness: There is no abdominal tenderness. Skin:     General: Skin is warm and dry. Neurological:      General: No focal deficit present. Mental Status: He is alert. Psychiatric:         Mood and Affect: Mood normal.         Behavior: Behavior normal.       ASSESSMENT & PLAN    1. Annual physical exam    - Comprehensive Metabolic Panel; Future    2. Need for influenza vaccination      3. Benign prostatic hyperplasia without lower urinary tract symptoms      4. Lipid screening    - Lipid, Fasting; Future    5. Herpes simplex of male genitalia    - valACYclovir (VALTREX) 500 MG tablet; Take 1 tablet by mouth daily  Dispense: 90 tablet; Refill: 3    6. Need for vaccination  - Influenza, FLUCELVAX, (age 10 mo+), IM, Preservative Free, 0.5 mL    Recent labs reviewed  Obtain flu vaccine  Declines covid vaccine    Return in about 1 year (around 9/30/2023).          Electronically signed by Gena Ortiz DO on 9/30/2022

## 2023-09-07 DIAGNOSIS — R16.1 SPLENOMEGALY: Primary | ICD-10-CM

## 2023-09-15 ENCOUNTER — HOSPITAL ENCOUNTER (OUTPATIENT)
Dept: INFUSION THERAPY | Age: 63
Discharge: HOME | End: 2023-09-15
Payer: COMMERCIAL

## 2023-09-15 DIAGNOSIS — R16.1 SPLENOMEGALY: ICD-10-CM

## 2023-09-15 LAB
ALBUMIN SERPL-MCNC: 4.6 GM/DL (ref 3.4–5)
ALP BLD-CCNC: 64 IU/L (ref 40–128)
ALT SERPL-CCNC: 14 U/L (ref 10–40)
ANION GAP SERPL CALCULATED.3IONS-SCNC: 10 MMOL/L (ref 4–16)
AST SERPL-CCNC: 20 IU/L (ref 15–37)
BASOPHILS ABSOLUTE: 0.1 K/CU MM
BASOPHILS RELATIVE PERCENT: 0.8 % (ref 0–1)
BILIRUB SERPL-MCNC: 0.9 MG/DL (ref 0–1)
BUN SERPL-MCNC: 16 MG/DL (ref 6–23)
CALCIUM SERPL-MCNC: 9.5 MG/DL (ref 8.3–10.6)
CHLORIDE BLD-SCNC: 105 MMOL/L (ref 99–110)
CO2: 24 MMOL/L (ref 21–32)
CREAT SERPL-MCNC: 0.8 MG/DL (ref 0.9–1.3)
DIFFERENTIAL TYPE: ABNORMAL
EOSINOPHILS ABSOLUTE: 0.2 K/CU MM
EOSINOPHILS RELATIVE PERCENT: 3.7 % (ref 0–3)
GFR SERPL CREATININE-BSD FRML MDRD: >60 ML/MIN/1.73M2
GLUCOSE SERPL-MCNC: 100 MG/DL (ref 70–99)
HCT VFR BLD CALC: 47.1 % (ref 42–52)
HEMOGLOBIN: 15.2 GM/DL (ref 13.5–18)
LYMPHOCYTES ABSOLUTE: 1.5 K/CU MM
LYMPHOCYTES RELATIVE PERCENT: 22.3 % (ref 24–44)
MCH RBC QN AUTO: 31.3 PG (ref 27–31)
MCHC RBC AUTO-ENTMCNC: 32.3 % (ref 32–36)
MCV RBC AUTO: 97.1 FL (ref 78–100)
MONOCYTES ABSOLUTE: 0.5 K/CU MM
MONOCYTES RELATIVE PERCENT: 8.1 % (ref 0–4)
PDW BLD-RTO: 12.6 % (ref 11.7–14.9)
PLATELET # BLD: 271 K/CU MM (ref 140–440)
PMV BLD AUTO: 10.7 FL (ref 7.5–11.1)
POTASSIUM SERPL-SCNC: 5.2 MMOL/L (ref 3.5–5.1)
RBC # BLD: 4.85 M/CU MM (ref 4.6–6.2)
SEGMENTED NEUTROPHILS ABSOLUTE COUNT: 4.3 K/CU MM
SEGMENTED NEUTROPHILS RELATIVE PERCENT: 65.1 % (ref 36–66)
SODIUM BLD-SCNC: 139 MMOL/L (ref 135–145)
TOTAL PROTEIN: 6.2 GM/DL (ref 6.4–8.2)
WBC # BLD: 6.5 K/CU MM (ref 4–10.5)

## 2023-09-15 PROCEDURE — 85025 COMPLETE CBC W/AUTO DIFF WBC: CPT

## 2023-09-15 PROCEDURE — 80053 COMPREHEN METABOLIC PANEL: CPT

## 2023-09-15 PROCEDURE — 36415 COLL VENOUS BLD VENIPUNCTURE: CPT

## 2023-09-22 ENCOUNTER — TELEPHONE (OUTPATIENT)
Dept: FAMILY MEDICINE CLINIC | Age: 63
End: 2023-09-22

## 2023-09-22 ENCOUNTER — HOSPITAL ENCOUNTER (OUTPATIENT)
Dept: INFUSION THERAPY | Age: 63
Discharge: HOME | End: 2023-09-22
Payer: COMMERCIAL

## 2023-09-22 ENCOUNTER — OFFICE VISIT (OUTPATIENT)
Dept: ONCOLOGY | Age: 63
End: 2023-09-22

## 2023-09-22 VITALS
WEIGHT: 234.2 LBS | BODY MASS INDEX: 31.04 KG/M2 | TEMPERATURE: 97.8 F | OXYGEN SATURATION: 97 % | HEART RATE: 82 BPM | HEIGHT: 73 IN | SYSTOLIC BLOOD PRESSURE: 120 MMHG | RESPIRATION RATE: 16 BRPM | DIASTOLIC BLOOD PRESSURE: 66 MMHG

## 2023-09-22 DIAGNOSIS — R16.1 SPLENOMEGALY: Primary | ICD-10-CM

## 2023-09-22 PROCEDURE — 99211 OFF/OP EST MAY X REQ PHY/QHP: CPT

## 2023-09-22 ASSESSMENT — PATIENT HEALTH QUESTIONNAIRE - PHQ9
SUM OF ALL RESPONSES TO PHQ QUESTIONS 1-9: 0
SUM OF ALL RESPONSES TO PHQ9 QUESTIONS 1 & 2: 0
1. LITTLE INTEREST OR PLEASURE IN DOING THINGS: 0
SUM OF ALL RESPONSES TO PHQ QUESTIONS 1-9: 0
2. FEELING DOWN, DEPRESSED OR HOPELESS: 0
SUM OF ALL RESPONSES TO PHQ QUESTIONS 1-9: 0
SUM OF ALL RESPONSES TO PHQ QUESTIONS 1-9: 0

## 2023-09-22 NOTE — PROGRESS NOTES
MA Rooming Questions  Patient: Karyn Bass  MRN: [id number]    Date: 9/22/2023        1. Do you have any new issues?   no         2. Do you need any refills on medications?    no    3. Have you had any imaging done since your last visit?   no    4. Have you been hospitalized or seen in the emergency room since your last visit here?   no    5. Did the patient have a depression screening completed today?  Yes    No data recorded     PHQ-9 Given to (if applicable):               PHQ-9 Score (if applicable):                     [] Positive     []  Negative              Does question #9 need addressed (if applicable)                     [] Yes    []  No               Lizzy Manjarrez CMA
Results   Component Value Date     09/15/2023    K 5.2 (H) 09/15/2023     09/15/2023    CO2 24 09/15/2023    BUN 16 09/15/2023    CREATININE 0.8 (L) 09/15/2023    GLUCOSE 100 (H) 09/15/2023    CALCIUM 9.5 09/15/2023    PROT 6.2 (L) 09/15/2023    LABALBU 4.6 09/15/2023    BILITOT 0.9 09/15/2023    ALKPHOS 64 09/15/2023    AST 20 09/15/2023    ALT 14 09/15/2023    LABGLOM >60 09/15/2023    GFRAA >60 09/23/2022    AGRATIO 3.1 (H) 09/23/2022    GLOB 2.2 09/15/2021     Lab Results   Component Value Date    TSHHS 2.650 05/23/2017    T4FREE 1.24 05/23/2017    FT3 3.2 05/23/2017     Immunology:  Lab Results   Component Value Date    PROT 6.2 (L) 09/15/2023    ALBUMINELP 4.1 07/25/2011    LABALPH 0.2 07/25/2011    LABALPH 0.6 07/25/2011    GAMGLOB 1.3 07/25/2011     Anemia Panel:  Lab Results   Component Value Date    [id number] 541.2 05/23/2017    FOLATE >20.0 (H) 05/23/2017     Tumor Markers:  Lab Results   Component Value Date    PSA 0.63 09/15/2021      Observations:  PHQ-9 Total Score: 0 (9/22/2023  9:30 AM)      Assessment & Plan:  1. He has mild hepatosplenomegaly with some fatty change in his liver and small gallstone. He does not have any signs of splenic sequestration despite his splenomegaly. Ultrasound of the liver and spleen in April 2016 revealed stable splenomegaly, 13.5 cm, with gallstones. Ultrasound of the abdomen in April 2018 revealed borderline splenomegaly to 13.8 cm gallstone and fatty liver. Ultrasound in September 2020 showed splenomegaly at 12.8 cm. CBC and CMP in September 2021 were grossly unremarkable. CBC and CMP in 9/2022 grossly stable for him. He is asymptomatic. 9/15/2023 potassium 5.2. I recommend to cut back on orange juice and repeat potassium level with next visit with PCP. LFTs wnl. CBC grossly unremarkable. He is agreeable to have repeat labs prior to next OV. He refused FU imaging study of the spleen. He lost weight intentionally.      2. He has followup with

## 2023-09-22 NOTE — TELEPHONE ENCOUNTER
----- Message from Sherryl Nageotte sent at 9/22/2023 11:38 AM EDT -----  Subject: Referral Request    Reason for referral request? Aaron Ghosh would like to know if he needs to get   lab work done prior to his 10/5/ appointment. Please call patient and   advise if there are lab orders for him to get done. Provider patient wants to be referred to(if known):     Provider Phone Number(if known):     Additional Information for Provider?   ---------------------------------------------------------------------------  --------------  [address] [place]    [phone]; OK to leave message on voicemail  ---------------------------------------------------------------------------  --------------

## 2023-09-29 DIAGNOSIS — R35.1 NOCTURIA: ICD-10-CM

## 2023-09-29 DIAGNOSIS — E78.2 MODERATE MIXED HYPERLIPIDEMIA NOT REQUIRING STATIN THERAPY: ICD-10-CM

## 2023-09-29 DIAGNOSIS — Z00.00 ANNUAL PHYSICAL EXAM: ICD-10-CM

## 2023-09-29 DIAGNOSIS — R73.01 IMPAIRED FASTING GLUCOSE: ICD-10-CM

## 2023-09-29 LAB
ALBUMIN SERPL-MCNC: 4.5 G/DL (ref 3.4–5)
ALBUMIN/GLOB SERPL: 2.5 {RATIO} (ref 1.1–2.2)
ALP SERPL-CCNC: 66 U/L (ref 40–129)
ALT SERPL-CCNC: 15 U/L (ref 10–40)
ANION GAP SERPL CALCULATED.3IONS-SCNC: 10 MMOL/L (ref 3–16)
AST SERPL-CCNC: 24 U/L (ref 15–37)
BILIRUB SERPL-MCNC: 1 MG/DL (ref 0–1)
BUN SERPL-MCNC: 18 MG/DL (ref 7–20)
CALCIUM SERPL-MCNC: 8.7 MG/DL (ref 8.3–10.6)
CHLORIDE SERPL-SCNC: 105 MMOL/L (ref 99–110)
CHOLEST SERPL-MCNC: 154 MG/DL (ref 0–199)
CO2 SERPL-SCNC: 27 MMOL/L (ref 21–32)
CREAT SERPL-MCNC: 0.9 MG/DL (ref 0.8–1.3)
GFR SERPLBLD CREATININE-BSD FMLA CKD-EPI: >60 ML/MIN/{1.73_M2}
GLUCOSE SERPL-MCNC: 97 MG/DL (ref 70–99)
HDLC SERPL-MCNC: 47 MG/DL (ref 40–60)
LDL CHOLESTEROL CALCULATED: 82 MG/DL
POTASSIUM SERPL-SCNC: 5.4 MMOL/L (ref 3.5–5.1)
PROT SERPL-MCNC: 6.3 G/DL (ref 6.4–8.2)
SODIUM SERPL-SCNC: 142 MMOL/L (ref 136–145)
TRIGL SERPL-MCNC: 125 MG/DL (ref 0–150)
VLDLC SERPL CALC-MCNC: 25 MG/DL

## 2023-09-30 LAB
EST. AVERAGE GLUCOSE BLD GHB EST-MCNC: 105.4 MG/DL
HBA1C MFR BLD: 5.3 %

## 2023-10-05 ENCOUNTER — OFFICE VISIT (OUTPATIENT)
Dept: FAMILY MEDICINE CLINIC | Age: 63
End: 2023-10-05
Payer: COMMERCIAL

## 2023-10-05 VITALS
WEIGHT: 236.2 LBS | OXYGEN SATURATION: 97 % | SYSTOLIC BLOOD PRESSURE: 118 MMHG | BODY MASS INDEX: 31.3 KG/M2 | HEIGHT: 73 IN | DIASTOLIC BLOOD PRESSURE: 66 MMHG | HEART RATE: 90 BPM

## 2023-10-05 DIAGNOSIS — Z23 ENCOUNTER FOR IMMUNIZATION: ICD-10-CM

## 2023-10-05 DIAGNOSIS — E87.5 HYPERKALEMIA: ICD-10-CM

## 2023-10-05 DIAGNOSIS — A60.02 HERPES SIMPLEX OF MALE GENITALIA: ICD-10-CM

## 2023-10-05 DIAGNOSIS — Z00.00 ANNUAL PHYSICAL EXAM: Primary | ICD-10-CM

## 2023-10-05 PROCEDURE — 90674 CCIIV4 VAC NO PRSV 0.5 ML IM: CPT | Performed by: STUDENT IN AN ORGANIZED HEALTH CARE EDUCATION/TRAINING PROGRAM

## 2023-10-05 PROCEDURE — 90471 IMMUNIZATION ADMIN: CPT | Performed by: STUDENT IN AN ORGANIZED HEALTH CARE EDUCATION/TRAINING PROGRAM

## 2023-10-05 PROCEDURE — 99396 PREV VISIT EST AGE 40-64: CPT | Performed by: STUDENT IN AN ORGANIZED HEALTH CARE EDUCATION/TRAINING PROGRAM

## 2023-10-05 PROCEDURE — G8482 FLU IMMUNIZE ORDER/ADMIN: HCPCS | Performed by: STUDENT IN AN ORGANIZED HEALTH CARE EDUCATION/TRAINING PROGRAM

## 2023-10-05 RX ORDER — VALACYCLOVIR HYDROCHLORIDE 500 MG/1
500 TABLET, FILM COATED ORAL DAILY
Qty: 90 TABLET | Refills: 3 | Status: SHIPPED | OUTPATIENT
Start: 2023-10-05 | End: 2024-10-04

## 2023-10-05 ASSESSMENT — ENCOUNTER SYMPTOMS
SHORTNESS OF BREATH: 0
NAUSEA: 0
WHEEZING: 0
ABDOMINAL PAIN: 0
SORE THROAT: 0

## 2023-10-09 DIAGNOSIS — E87.5 HYPERKALEMIA: Primary | ICD-10-CM

## 2023-10-09 NOTE — RESULT ENCOUNTER NOTE
Please discuss patient labs show elevation in potassium otherwise normal. Does he intake a lot of vegetables? Does he take any supplements? May need to decrease his supplement intake I would like to repeat again in 1 month.

## 2024-04-24 ENCOUNTER — TELEPHONE (OUTPATIENT)
Dept: FAMILY MEDICINE CLINIC | Age: 64
End: 2024-04-24

## 2024-04-24 NOTE — TELEPHONE ENCOUNTER
Spoke with patient about being due for his colorectal screening, patient would like a referral to GI but is going to check with his insurance first and give us a call back.

## 2024-06-03 ENCOUNTER — COMMUNITY OUTREACH (OUTPATIENT)
Dept: FAMILY MEDICINE CLINIC | Age: 64
End: 2024-06-03

## 2024-09-02 NOTE — PROGRESS NOTES
Patient Name: Chet Reyes  Patient : 1960  Patient MRN: [id number]     Primary Oncologist: Chris Thomson MD  Referring Provider: Alma Bazan DO     Date of Service: 2024      Chief Complaint:   Chief Complaint   Patient presents with    Follow-up     He came in for follow-up visit.     Patient Active Problem List:     BPH (benign prostatic hyperplasia)     Herpes simplex of male genitalia     Splenomegaly, not elsewhere classified     Other lichen planus     HPI:   Chet Reyes is a pleasant 64-year-old  male patient who was initially referred in 2011 for evaluation of splenomegaly.  He had a remote history of EBV.  I reviewed his CAT scan at the time with the radiologist who felt that he had an elongated spleen and the volume of the spleen was rather normal.   2011 Serum protein electrophoresis study was negative and CBC normal, with no signs of hypersplenism.    Followup ultrasound of the spleen in 2012 showed no significant change with mild splenomegaly, measuring about 15 cm.     Blood test on April 10, 2013 showed white cell count of 8.6, hemoglobin of 14.6, platelet 282, MCV 95.  CMP was benign, except for slightly elevated AST at 40.  This slightly elevated liver enzyme could be related to the medications versus alcohol.  He admitted that he drinks beer occasionally.     2013 white cell count at 6.5, hemoglobin 15.4, platelet 228.  CMP within normal limits, except for glucose of 107.  PSA was 1.29. Abdominal ultrasound in 2013 showed stable mild to moderate splenomegaly at 14.5 cm. There is no sign of splenic sequestration.     Colonoscopy in 2013 by Dr. Rodriguez showed a single, less than 5 mm polyp, found in  rectum, which was removed.  The pathology report was consistent with a hyperplastic polyp.  He had green light laser surgery for the BPH on 2013, by Dr. River and since then he stopped taking the Flomax.

## 2024-09-18 DIAGNOSIS — A60.02 HERPES SIMPLEX OF MALE GENITALIA: ICD-10-CM

## 2024-09-18 RX ORDER — VALACYCLOVIR HYDROCHLORIDE 500 MG/1
500 TABLET, FILM COATED ORAL DAILY
Qty: 90 TABLET | Refills: 3 | Status: SHIPPED | OUTPATIENT
Start: 2024-09-18

## 2024-09-23 ENCOUNTER — HOSPITAL ENCOUNTER (OUTPATIENT)
Dept: INFUSION THERAPY | Age: 64
Discharge: HOME | End: 2024-09-23
Payer: COMMERCIAL

## 2024-09-23 DIAGNOSIS — R16.1 SPLENOMEGALY: ICD-10-CM

## 2024-09-23 LAB
ALBUMIN SERPL-MCNC: 4.5 G/DL (ref 3.4–5)
ALBUMIN/GLOB SERPL: 2.4 {RATIO} (ref 1.1–2.2)
ALP SERPL-CCNC: 79 U/L (ref 40–129)
ALT SERPL-CCNC: 23 U/L (ref 10–40)
ANION GAP SERPL CALCULATED.3IONS-SCNC: 13 MMOL/L (ref 9–17)
AST SERPL-CCNC: 42 U/L (ref 15–37)
BASOPHILS # BLD: 0.04 K/UL
BASOPHILS NFR BLD: 1 % (ref 0–1)
BILIRUB SERPL-MCNC: 1.3 MG/DL (ref 0–1)
BUN SERPL-MCNC: 19 MG/DL (ref 7–20)
CALCIUM SERPL-MCNC: 9.9 MG/DL (ref 8.3–10.6)
CHLORIDE SERPL-SCNC: 105 MMOL/L (ref 99–110)
CO2 SERPL-SCNC: 21 MMOL/L (ref 21–32)
CREAT SERPL-MCNC: 1.1 MG/DL (ref 0.8–1.3)
EOSINOPHIL # BLD: 0.2 K/UL
EOSINOPHILS RELATIVE PERCENT: 3 % (ref 0–3)
ERYTHROCYTE [DISTWIDTH] IN BLOOD BY AUTOMATED COUNT: 13.3 % (ref 11.7–14.9)
GFR, ESTIMATED: 67 ML/MIN/1.73M2
GLUCOSE SERPL-MCNC: 93 MG/DL (ref 74–99)
HCT VFR BLD AUTO: 47.4 % (ref 42–52)
HGB BLD-MCNC: 15.5 G/DL (ref 13.5–18)
LYMPHOCYTES NFR BLD: 1.55 K/UL
LYMPHOCYTES RELATIVE PERCENT: 22 % (ref 24–44)
MCH RBC QN AUTO: 30.9 PG (ref 27–31)
MCHC RBC AUTO-ENTMCNC: 32.7 G/DL (ref 32–36)
MCV RBC AUTO: 94.6 FL (ref 78–100)
MONOCYTES NFR BLD: 0.57 K/UL
MONOCYTES NFR BLD: 8 % (ref 0–4)
NEUTROPHILS NFR BLD: 67 % (ref 36–66)
NEUTS SEG NFR BLD: 4.86 K/UL
PLATELET # BLD AUTO: 299 K/UL (ref 140–440)
PMV BLD AUTO: 10.3 FL (ref 7.5–11.1)
POTASSIUM SERPL-SCNC: 4.7 MMOL/L (ref 3.5–5.1)
PROT SERPL-MCNC: 6.3 G/DL (ref 6.4–8.2)
RBC # BLD AUTO: 5.01 M/UL (ref 4.6–6.2)
SODIUM SERPL-SCNC: 140 MMOL/L (ref 136–145)
WBC OTHER # BLD: 7.2 K/UL (ref 4–10.5)

## 2024-09-23 PROCEDURE — 36415 COLL VENOUS BLD VENIPUNCTURE: CPT

## 2024-09-23 PROCEDURE — 80053 COMPREHEN METABOLIC PANEL: CPT

## 2024-09-23 PROCEDURE — 85025 COMPLETE CBC W/AUTO DIFF WBC: CPT

## 2024-09-30 ENCOUNTER — HOSPITAL ENCOUNTER (OUTPATIENT)
Dept: INFUSION THERAPY | Age: 64
Discharge: HOME | End: 2024-09-30
Payer: COMMERCIAL

## 2024-09-30 ENCOUNTER — OFFICE VISIT (OUTPATIENT)
Dept: ONCOLOGY | Age: 64
End: 2024-09-30
Payer: COMMERCIAL

## 2024-09-30 VITALS
DIASTOLIC BLOOD PRESSURE: 62 MMHG | SYSTOLIC BLOOD PRESSURE: 132 MMHG | HEART RATE: 87 BPM | WEIGHT: 237 LBS | OXYGEN SATURATION: 97 % | HEIGHT: 73 IN | RESPIRATION RATE: 16 BRPM | TEMPERATURE: 97.4 F | BODY MASS INDEX: 31.41 KG/M2

## 2024-09-30 DIAGNOSIS — R16.1 SPLENOMEGALY: Primary | ICD-10-CM

## 2024-09-30 PROCEDURE — 99213 OFFICE O/P EST LOW 20 MIN: CPT | Performed by: INTERNAL MEDICINE

## 2024-09-30 PROCEDURE — G8427 DOCREV CUR MEDS BY ELIG CLIN: HCPCS | Performed by: INTERNAL MEDICINE

## 2024-09-30 PROCEDURE — G8417 CALC BMI ABV UP PARAM F/U: HCPCS | Performed by: INTERNAL MEDICINE

## 2024-09-30 PROCEDURE — 3017F COLORECTAL CA SCREEN DOC REV: CPT | Performed by: INTERNAL MEDICINE

## 2024-09-30 PROCEDURE — 99211 OFF/OP EST MAY X REQ PHY/QHP: CPT

## 2024-09-30 PROCEDURE — 1036F TOBACCO NON-USER: CPT | Performed by: INTERNAL MEDICINE

## 2024-09-30 ASSESSMENT — PATIENT HEALTH QUESTIONNAIRE - PHQ9
SUM OF ALL RESPONSES TO PHQ QUESTIONS 1-9: 0
2. FEELING DOWN, DEPRESSED OR HOPELESS: NOT AT ALL
SUM OF ALL RESPONSES TO PHQ9 QUESTIONS 1 & 2: 0
1. LITTLE INTEREST OR PLEASURE IN DOING THINGS: NOT AT ALL

## 2024-09-30 NOTE — PROGRESS NOTES
MA Rooming Questions  Patient: Chet Reyes  MRN: [id number]    Date: 9/30/2024        1. Do you have any new issues?   no         2. Do you need any refills on medications?    no    3. Have you had any imaging done since your last visit?   yes - xray on L knee at [facility] in July    4. Have you been hospitalized or seen in the emergency room since your last visit here?   no    5. Did the patient have a depression screening completed today? Yes    No data recorded     PHQ-9 Given to (if applicable):               PHQ-9 Score (if applicable):                     [] Positive     []  Negative              Does question #9 need addressed (if applicable)                     [] Yes    []  No               Mely Dozier MA

## 2024-09-30 NOTE — PROGRESS NOTES
Patient Name: Chet Reyes  Patient : 1960  Patient MRN: [id number]     Primary Oncologist: Chris Thomson MD  Referring Provider: Alma Bazan DO     Date of Service: 2024      Chief Complaint:   Chief Complaint   Patient presents with    Follow-up     He came in for follow-up visit.     Patient Active Problem List:     BPH (benign prostatic hyperplasia)     Herpes simplex of male genitalia     Splenomegaly, not elsewhere classified     Other lichen planus     HPI:   Chet Reyes is a pleasant 64-year-old  male patient who was initially referred in 2011 for evaluation of splenomegaly.  He had a remote history of EBV.  I reviewed his CAT scan at the time with the radiologist who felt that he had an elongated spleen and the volume of the spleen was rather normal.   2011 Serum protein electrophoresis study was negative and CBC normal, with no signs of hypersplenism.    Followup ultrasound of the spleen in 2012 showed no significant change with mild splenomegaly, measuring about 15 cm.     Blood test on April 10, 2013 showed white cell count of 8.6, hemoglobin of 14.6, platelet 282, MCV 95.  CMP was benign, except for slightly elevated AST at 40.  This slightly elevated liver enzyme could be related to the medications versus alcohol.  He admitted that he drinks beer occasionally.     2013 white cell count at 6.5, hemoglobin 15.4, platelet 228.  CMP within normal limits, except for glucose of 107.  PSA was 1.29. Abdominal ultrasound in 2013 showed stable mild to moderate splenomegaly at 14.5 cm. There is no sign of splenic sequestration.     Colonoscopy in 2013 by Dr. Rodriguez showed a single, less than 5 mm polyp, found in  rectum, which was removed.  The pathology report was consistent with a hyperplastic polyp.  He had green light laser surgery for the BPH on 2013, by Dr. River and since then he stopped taking the Flomax.

## 2024-10-07 ENCOUNTER — OFFICE VISIT (OUTPATIENT)
Dept: FAMILY MEDICINE CLINIC | Age: 64
End: 2024-10-07
Payer: COMMERCIAL

## 2024-10-07 VITALS
HEIGHT: 73 IN | WEIGHT: 236.4 LBS | HEART RATE: 79 BPM | RESPIRATION RATE: 16 BRPM | SYSTOLIC BLOOD PRESSURE: 110 MMHG | OXYGEN SATURATION: 97 % | BODY MASS INDEX: 31.33 KG/M2 | DIASTOLIC BLOOD PRESSURE: 74 MMHG

## 2024-10-07 DIAGNOSIS — Z00.00 ANNUAL PHYSICAL EXAM: ICD-10-CM

## 2024-10-07 DIAGNOSIS — E87.5 HYPERKALEMIA: ICD-10-CM

## 2024-10-07 DIAGNOSIS — Z12.11 COLON CANCER SCREENING: ICD-10-CM

## 2024-10-07 DIAGNOSIS — Z00.00 ENCOUNTER FOR WELL ADULT EXAM WITHOUT ABNORMAL FINDINGS: ICD-10-CM

## 2024-10-07 DIAGNOSIS — Z23 FLU VACCINE NEED: Primary | ICD-10-CM

## 2024-10-07 DIAGNOSIS — N40.0 BENIGN PROSTATIC HYPERPLASIA WITHOUT LOWER URINARY TRACT SYMPTOMS: ICD-10-CM

## 2024-10-07 DIAGNOSIS — R16.1 SPLENOMEGALY: ICD-10-CM

## 2024-10-07 LAB
CHOLEST SERPL-MCNC: 154 MG/DL (ref 0–199)
HDLC SERPL-MCNC: 46 MG/DL (ref 40–60)
LDL CHOLESTEROL: 93 MG/DL
TRIGL SERPL-MCNC: 75 MG/DL (ref 0–150)
VLDLC SERPL CALC-MCNC: 15 MG/DL

## 2024-10-07 PROCEDURE — G8484 FLU IMMUNIZE NO ADMIN: HCPCS | Performed by: STUDENT IN AN ORGANIZED HEALTH CARE EDUCATION/TRAINING PROGRAM

## 2024-10-07 PROCEDURE — 90471 IMMUNIZATION ADMIN: CPT | Performed by: STUDENT IN AN ORGANIZED HEALTH CARE EDUCATION/TRAINING PROGRAM

## 2024-10-07 PROCEDURE — 90661 CCIIV3 VAC ABX FR 0.5 ML IM: CPT | Performed by: STUDENT IN AN ORGANIZED HEALTH CARE EDUCATION/TRAINING PROGRAM

## 2024-10-07 PROCEDURE — 99396 PREV VISIT EST AGE 40-64: CPT | Performed by: STUDENT IN AN ORGANIZED HEALTH CARE EDUCATION/TRAINING PROGRAM

## 2024-10-07 RX ORDER — VALACYCLOVIR HYDROCHLORIDE 500 MG/1
500 TABLET, FILM COATED ORAL DAILY
COMMUNITY

## 2024-10-07 SDOH — ECONOMIC STABILITY: FOOD INSECURITY: WITHIN THE PAST 12 MONTHS, YOU WORRIED THAT YOUR FOOD WOULD RUN OUT BEFORE YOU GOT MONEY TO BUY MORE.: NEVER TRUE

## 2024-10-07 SDOH — ECONOMIC STABILITY: FOOD INSECURITY: WITHIN THE PAST 12 MONTHS, THE FOOD YOU BOUGHT JUST DIDN'T LAST AND YOU DIDN'T HAVE MONEY TO GET MORE.: NEVER TRUE

## 2024-10-07 SDOH — ECONOMIC STABILITY: INCOME INSECURITY: HOW HARD IS IT FOR YOU TO PAY FOR THE VERY BASICS LIKE FOOD, HOUSING, MEDICAL CARE, AND HEATING?: NOT HARD AT ALL

## 2024-10-07 ASSESSMENT — ENCOUNTER SYMPTOMS
NAUSEA: 0
SORE THROAT: 0
ABDOMINAL PAIN: 0
SHORTNESS OF BREATH: 0
WHEEZING: 0

## 2024-10-07 NOTE — PROGRESS NOTES
Well Adult Note  Name: Chet Reyes Today’s Date: 10/7/2024   MRN: [id number] Sex: Male   Age: 64 y.o. Ethnicity: Non- / Non    : 1960 Race: White (non-)      Chet Reyes is here for a well adult exam.       Subjective   History:  63 yo patient presents for annual physical exam. No acute complaints.     Hx. [place] follows with Dr. BENÍTEZ, normal    Splenomegaly, he last had ct in , declines today    No acute complaints  He reports blood buildup in     Review of Systems   Constitutional:  Negative for chills and fatigue.   HENT:  Negative for congestion and sore throat.    Respiratory:  Negative for shortness of breath and wheezing.    Cardiovascular:  Negative for chest pain and palpitations.   Gastrointestinal:  Negative for abdominal pain and nausea.   Genitourinary:  Negative for frequency and urgency.   Neurological:  Negative for light-headedness.       No Known Allergies  Prior to Visit Medications    Medication Sig Taking? Authorizing Provider   valACYclovir (VALTREX) 500 MG tablet Take 1 tablet by mouth daily Yes Saloni Gutierrez MD   VITAMIN D PO Take by mouth Yes Saloni Gutierrez MD   VITAMIN E PO Take by mouth Yes Saloni Gutierrez MD   Multiple Vitamins-Minerals (THERAPEUTIC MULTIVITAMIN-MINERALS) tablet Take 1 tablet by mouth daily Yes Saloni Gutierrez MD     Past Medical History:   Diagnosis Date    BPH (benign prostatic hyperplasia)     Cancer (HCC)     Testicular    Herpes simplex of male genitalia     Urinary frequency      Past Surgical History:   Procedure Laterality Date    COLONOSCOPY  13    poly's    HERNIA REPAIR Left 1978    inguinal    PROSTATE SURGERY      TESTICLE REMOVAL Left     TESTICLE REMOVAL      TONSILLECTOMY       Family History   Problem Relation Age of Onset    Heart Disease Mother     Cancer Father         prostate     Social History     Tobacco Use    Smoking status: Never    Smokeless tobacco: Never   Vaping Use

## 2025-03-12 RX ORDER — SODIUM CHLORIDE, SODIUM LACTATE, POTASSIUM CHLORIDE, CALCIUM CHLORIDE 600; 310; 30; 20 MG/100ML; MG/100ML; MG/100ML; MG/100ML
INJECTION, SOLUTION INTRAVENOUS CONTINUOUS
Status: CANCELLED | OUTPATIENT
Start: 2025-03-21

## 2025-03-12 NOTE — PROGRESS NOTES
Patient will be called with an arrival time on 3/20/2025 for his procedure at [facility] on 3/21/2025.    NOTHING TO EAT OR DRINK AFTER MIDNIGHT DAY OF SURGERY    1. Enter thru the hospital main entrance on day of surgery, check in at the Information Desk. If you arrive prior to 6:00am, enter thru the ER entrance.    2. Follow the directions as prescribed by the doctor for your procedure and medications.         Morning of surgery take:         Stop vitamins, supplements and NSAIDS:      3. Check with your Doctor regarding stopping blood thinners and follow their instructions.    4. Do not smoke, vape or use chewing tobacco morning of surgery. Do not drink any alcoholic beverages 24 hours prior to surgery.       This includes NA Beer. No street drugs 7 days prior to surgery.    5. If you have dentures, contacts of glasses they will be removed before going to the OR; please bring a case.    6. Please bring picture ID, insurance card, paperwork from the doctor’s office (H & P, Consent, & card for implantable devices).    7. Take a shower with an antibacterial soap the night before surgery and the morning of surgery. Do not put anything on your skin      After your morning shower.    8. You will need a responsible adult to drive you home and check on you after surgery.

## 2025-03-20 ENCOUNTER — ANESTHESIA EVENT (OUTPATIENT)
Dept: ENDOSCOPY | Age: 65
End: 2025-03-20
Payer: COMMERCIAL

## 2025-03-20 RX ORDER — SODIUM CHLORIDE 0.9 % (FLUSH) 0.9 %
5-40 SYRINGE (ML) INJECTION EVERY 12 HOURS SCHEDULED
Status: CANCELLED | OUTPATIENT
Start: 2025-03-20

## 2025-03-20 RX ORDER — SODIUM CHLORIDE 0.9 % (FLUSH) 0.9 %
5-40 SYRINGE (ML) INJECTION PRN
Status: CANCELLED | OUTPATIENT
Start: 2025-03-20

## 2025-03-20 RX ORDER — SODIUM CHLORIDE 9 MG/ML
INJECTION, SOLUTION INTRAVENOUS PRN
Status: CANCELLED | OUTPATIENT
Start: 2025-03-20

## 2025-03-20 ASSESSMENT — LIFESTYLE VARIABLES: SMOKING_STATUS: 0

## 2025-03-20 NOTE — PROGRESS NOTES
3/20/25 -  for patient: surgery @ [facility] on  3/21/25 @ 1130, arrival 1000. NPO status and morning medications reviewed. Please call with any questions.

## 2025-03-20 NOTE — ANESTHESIA PRE PROCEDURE
Department of Anesthesiology  Preprocedure Note       Name:  Chet Reyes   Age:  64 y.o.  :  1960                                          MRN:  [id number]         Date:  3/20/2025      Surgeon: Surgeon(s):  Royal Le MD    Procedure: Procedure(s):  COLORECTAL CANCER SCREENING, NOT HIGH RISK    Medications prior to admission:   Prior to Admission medications    Medication Sig Start Date End Date Taking? Authorizing Provider   valACYclovir (VALTREX) 500 MG tablet Take 1 tablet by mouth daily    Saloni Gutierrez MD   VITAMIN D PO Take by mouth    Saloni Gutierrez MD   VITAMIN E PO Take by mouth    Saloni Gutierrez MD   Multiple Vitamins-Minerals (THERAPEUTIC MULTIVITAMIN-MINERALS) tablet Take 1 tablet by mouth daily    Saloni Gutierrez MD       Current medications:    No current facility-administered medications for this encounter.     Current Outpatient Medications   Medication Sig Dispense Refill   • valACYclovir (VALTREX) 500 MG tablet Take 1 tablet by mouth daily     • VITAMIN D PO Take by mouth     • VITAMIN E PO Take by mouth     • Multiple Vitamins-Minerals (THERAPEUTIC MULTIVITAMIN-MINERALS) tablet Take 1 tablet by mouth daily         Allergies:  No Known Allergies    Problem List:    Patient Active Problem List   Diagnosis Code   • BPH (benign prostatic hyperplasia) N40.0   • Herpes simplex of male genitalia A60.02   • Splenomegaly R16.1   • Other lichen planus L43.8   • 2019-nCoV acute respiratory disease U07.1, J06.9   • Left groin pain R10.32   • Right inguinal hernia K40.90       Past Medical History:        Diagnosis Date   • BPH (benign prostatic hyperplasia)    • Cancer (HCC)     Testicular   • Herpes simplex of male genitalia    • Urinary frequency        Past Surgical History:        Procedure Laterality Date   • COLONOSCOPY  2013    poly's   • HERNIA REPAIR Left 1978    inguinal   • PROSTATE SURGERY     • TESTICLE REMOVAL Left    • TONSILLECTOMY         Social

## 2025-03-21 ENCOUNTER — ANESTHESIA (OUTPATIENT)
Dept: ENDOSCOPY | Age: 65
End: 2025-03-21
Payer: COMMERCIAL

## 2025-03-21 ENCOUNTER — HOSPITAL ENCOUNTER (OUTPATIENT)
Age: 65
Setting detail: OUTPATIENT SURGERY
Discharge: HOME | End: 2025-03-21
Attending: SPECIALIST | Admitting: SPECIALIST
Payer: COMMERCIAL

## 2025-03-21 VITALS
OXYGEN SATURATION: 98 % | RESPIRATION RATE: 18 BRPM | SYSTOLIC BLOOD PRESSURE: 130 MMHG | HEART RATE: 84 BPM | HEIGHT: 73 IN | BODY MASS INDEX: 31.28 KG/M2 | TEMPERATURE: 98.2 F | DIASTOLIC BLOOD PRESSURE: 85 MMHG | WEIGHT: 236 LBS

## 2025-03-21 PROCEDURE — 3700000001 HC ADD 15 MINUTES (ANESTHESIA): Performed by: SPECIALIST

## 2025-03-21 PROCEDURE — 2709999900 HC NON-CHARGEABLE SUPPLY: Performed by: SPECIALIST

## 2025-03-21 PROCEDURE — 6360000002 HC RX W HCPCS: Performed by: NURSE ANESTHETIST, CERTIFIED REGISTERED

## 2025-03-21 PROCEDURE — 3700000000 HC ANESTHESIA ATTENDED CARE: Performed by: SPECIALIST

## 2025-03-21 PROCEDURE — 7100000011 HC PHASE II RECOVERY - ADDTL 15 MIN: Performed by: SPECIALIST

## 2025-03-21 PROCEDURE — 7100000010 HC PHASE II RECOVERY - FIRST 15 MIN: Performed by: SPECIALIST

## 2025-03-21 PROCEDURE — 3609027000 HC COLONOSCOPY: Performed by: SPECIALIST

## 2025-03-21 RX ORDER — LIDOCAINE HYDROCHLORIDE 20 MG/ML
INJECTION, SOLUTION EPIDURAL; INFILTRATION; INTRACAUDAL; PERINEURAL
Status: DISCONTINUED | OUTPATIENT
Start: 2025-03-21 | End: 2025-03-21 | Stop reason: SDUPTHER

## 2025-03-21 RX ORDER — PROPOFOL 10 MG/ML
INJECTION, EMULSION INTRAVENOUS
Status: DISCONTINUED | OUTPATIENT
Start: 2025-03-21 | End: 2025-03-21 | Stop reason: SDUPTHER

## 2025-03-21 RX ADMIN — LIDOCAINE HYDROCHLORIDE 60 MG: 20 INJECTION, SOLUTION EPIDURAL; INFILTRATION; INTRACAUDAL; PERINEURAL at 11:35

## 2025-03-21 RX ADMIN — PROPOFOL 150 MG: 10 INJECTION, EMULSION INTRAVENOUS at 11:35

## 2025-03-21 ASSESSMENT — PAIN - FUNCTIONAL ASSESSMENT: PAIN_FUNCTIONAL_ASSESSMENT: 0-10

## 2025-03-21 NOTE — PROGRESS NOTES
1215 recd pt from [facility], report taken from SHAYAN Gannon.  Monitors applied, alarms on, pt is awake and alert, vss, pt given ice water and crackers, wife Miguel Ángel at bedside, pt has no c/o,

## 2025-03-21 NOTE — BRIEF OP NOTE
Brief Postoperative Note      Chet Reyes is a 64 y.o. male     Pre-operative Diagnosis: H/O COLON POLYP    Post-operative Diagnosis:  D. COLI /   HDS    Procedure: COLONOSCOPY    Anesthesia: MAC    Surgeons/Assistants:Royal Le MD     Estimated Blood Loss: NIL    Complications: None    Specimens: were not obtained  REC-- F/U  COLONOSCOPY 5 YRS    Royal Le MD   3/21/2025   12:07 PM

## 2025-03-21 NOTE — PROCEDURES
PROCEDURE NOTE  Date: 3/21/2025   Name: Chet Reyes  YOB: 1960    Procedures COLONOSCOPY REPORT DICTATED #[id number]

## 2025-03-21 NOTE — ANESTHESIA POSTPROCEDURE EVALUATION
Department of Anesthesiology  Postprocedure Note    Patient: Chet Reyes  MRN: [id number]  YOB: 1960  Date of evaluation: 3/21/2025    Procedure Summary       Date: 03/21/25 Room / Location: [place] [address] / [facility]    Anesthesia Start: 1130 Anesthesia Stop: 1206    Procedure: COLORECTAL CANCER SCREENING, NOT HIGH RISK Diagnosis:       Colon cancer screening      (Colon cancer screening [Z12.11])    Surgeons: Royal Le MD Responsible Provider: Lorraine Maradiaga MD    Anesthesia Type: MAC ASA Status: 3            Anesthesia Type: No value filed.    Jodi Phase I: Jodi Score: 10    Jodi Phase II: Jodi Score: 10    Anesthesia Post Evaluation    Patient location during evaluation: bedside  Patient participation: complete - patient participated  Level of consciousness: awake  Airway patency: patent  Nausea & Vomiting: no nausea and no vomiting  Cardiovascular status: blood pressure returned to baseline  Respiratory status: room air and spontaneous ventilation  Hydration status: stable    No notable events documented.

## 2025-03-21 NOTE — DISCHARGE INSTRUCTIONS
machines or use equipment.  Do not drink any alcoholic beverages.  Do not smoke while alone.  Avoid making important decisions.  Plan to spend a quiet, relaxed evening @ home.  Resume normal activities as you begin to feel better.  Eat lightly for your first meal, then gradually increase your diet to what is normal for you.  In case of nausea, avoid food and drink only clear liquids.  Resume food as nausea ceases.  Notify your surgeon if you experience fever, chills, large amount of bleeding, difficulty breathing, persistent nausea and vomiting or any other disturbing problem.  Call for a follow-up appointment with your surgeon.

## 2025-03-21 NOTE — PROGRESS NOTES
Pt alert and oriented x 4. Wife at bedside. Pre-op questions asked and answered appropriately by pt. Name, , armband verified, procedure, allergies, implants, prep status, last PO intake, history, meds.